# Patient Record
Sex: FEMALE | Race: WHITE | NOT HISPANIC OR LATINO | Employment: FULL TIME | URBAN - METROPOLITAN AREA
[De-identification: names, ages, dates, MRNs, and addresses within clinical notes are randomized per-mention and may not be internally consistent; named-entity substitution may affect disease eponyms.]

---

## 2023-04-05 PROBLEM — H92.02 LEFT EAR PAIN: Status: ACTIVE | Noted: 2023-04-05

## 2023-04-05 PROBLEM — M62.89 MUSCLE TIGHTNESS: Status: ACTIVE | Noted: 2023-04-05

## 2023-04-05 PROBLEM — E66.9 OBESITY: Status: ACTIVE | Noted: 2023-04-05

## 2023-04-05 PROBLEM — R50.9 FEVER: Status: ACTIVE | Noted: 2023-04-05

## 2023-04-05 PROBLEM — G47.00 INSOMNIA: Status: ACTIVE | Noted: 2023-04-05

## 2023-04-05 PROBLEM — R20.2 PARESTHESIA OF BOTH HANDS: Status: ACTIVE | Noted: 2023-04-05

## 2023-04-05 PROBLEM — M54.12 CERVICAL RADICULITIS: Status: ACTIVE | Noted: 2023-04-05

## 2023-04-05 PROBLEM — M75.122 COMPLETE TEAR OF LEFT ROTATOR CUFF: Status: ACTIVE | Noted: 2023-04-05

## 2023-04-05 PROBLEM — M75.102 LEFT ROTATOR CUFF TEAR: Status: ACTIVE | Noted: 2023-04-05
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
## 2023-04-05 PROBLEM — M76.30 ILIOTIBIAL BAND SYNDROME: Status: ACTIVE | Noted: 2023-04-05

## 2023-04-05 PROBLEM — M47.812 CERVICAL FACET SYNDROME: Status: ACTIVE | Noted: 2023-04-05

## 2023-04-05 PROBLEM — G56.41 CAUSALGIA OF RIGHT UPPER EXTREMITY: Status: ACTIVE | Noted: 2023-04-05

## 2023-04-05 PROBLEM — A08.4 VIRAL GASTROENTERITIS: Status: ACTIVE | Noted: 2023-04-05

## 2023-04-05 PROBLEM — M79.7 FIBROMYALGIA: Status: ACTIVE | Noted: 2023-04-05

## 2023-04-05 PROBLEM — M79.18 MYOFASCIAL PAIN: Status: ACTIVE | Noted: 2023-04-05

## 2023-04-05 PROBLEM — M25.511 PAIN IN JOINT OF RIGHT SHOULDER: Status: ACTIVE | Noted: 2023-04-05

## 2023-04-05 PROBLEM — M76.60 ACHILLES TENDONITIS: Status: ACTIVE | Noted: 2023-04-05

## 2023-04-05 PROBLEM — M54.2 NECK PAIN: Status: ACTIVE | Noted: 2023-04-05

## 2023-04-05 PROBLEM — R21 RASH: Status: ACTIVE | Noted: 2023-04-05

## 2023-04-05 PROBLEM — F41.9 ANXIETY: Status: ACTIVE | Noted: 2023-04-05

## 2023-04-05 PROBLEM — S00.93XA HEAD CONTUSION: Status: ACTIVE | Noted: 2023-04-05

## 2023-04-05 PROBLEM — M47.816 LUMBAR SPONDYLOSIS: Status: ACTIVE | Noted: 2023-04-05

## 2023-04-05 PROBLEM — J06.9 ACUTE RESPIRATORY DISEASE: Status: ACTIVE | Noted: 2023-04-05

## 2023-04-05 PROBLEM — M79.602 LEFT ARM PAIN: Status: ACTIVE | Noted: 2023-04-05

## 2023-04-05 PROBLEM — M19.90 OSTEOARTHRITIS: Status: ACTIVE | Noted: 2023-04-05

## 2023-04-05 PROBLEM — K57.92 ACUTE DIVERTICULITIS: Status: ACTIVE | Noted: 2023-04-05

## 2023-04-05 PROBLEM — L29.9 ITCHING: Status: ACTIVE | Noted: 2023-04-05

## 2023-04-05 PROBLEM — M75.82 TENDINITIS OF LEFT ROTATOR CUFF: Status: ACTIVE | Noted: 2023-04-05

## 2023-04-05 PROBLEM — M79.672 LEFT FOOT PAIN: Status: ACTIVE | Noted: 2023-04-05

## 2023-04-05 PROBLEM — M62.81 MUSCLE WEAKNESS: Status: ACTIVE | Noted: 2023-04-05

## 2023-04-05 PROBLEM — M54.16 LUMBAR RADICULOPATHY: Status: ACTIVE | Noted: 2023-04-05

## 2023-04-05 PROBLEM — K52.9 GASTROENTERITIS, ACUTE: Status: ACTIVE | Noted: 2023-04-05

## 2023-04-05 PROBLEM — R20.0 FACIAL NUMBNESS: Status: ACTIVE | Noted: 2023-04-05

## 2023-04-05 PROBLEM — M79.606 LOWER EXTREMITY PAIN: Status: ACTIVE | Noted: 2023-04-05

## 2023-04-05 PROBLEM — R26.2 DIFFICULTY WALKING: Status: ACTIVE | Noted: 2023-04-05

## 2023-04-05 PROBLEM — R79.89 LOW VITAMIN D LEVEL: Status: ACTIVE | Noted: 2023-04-05

## 2023-04-05 PROBLEM — Z20.822 SUSPECTED 2019 NOVEL CORONAVIRUS INFECTION: Status: ACTIVE | Noted: 2023-04-05

## 2023-04-05 PROBLEM — M75.50 SCAPULOTHORACIC BURSITIS: Status: ACTIVE | Noted: 2023-04-05

## 2023-04-05 PROBLEM — S40.022A CONTUSION OF LEFT ARM: Status: ACTIVE | Noted: 2023-04-05

## 2023-04-05 PROBLEM — M79.644 BILATERAL THUMB PAIN: Status: ACTIVE | Noted: 2023-04-05

## 2023-04-05 PROBLEM — M25.50 MULTIPLE JOINT PAIN: Status: ACTIVE | Noted: 2023-04-05

## 2023-04-05 PROBLEM — J32.9 SINUSITIS: Status: ACTIVE | Noted: 2023-04-05

## 2023-04-05 PROBLEM — M25.522 LEFT ELBOW PAIN: Status: ACTIVE | Noted: 2023-04-05

## 2023-04-05 PROBLEM — M65.4 RADIAL STYLOID TENOSYNOVITIS (DE QUERVAIN): Status: ACTIVE | Noted: 2023-04-05

## 2023-04-05 PROBLEM — R07.9 CHEST PAIN: Status: ACTIVE | Noted: 2023-04-05

## 2023-04-05 PROBLEM — M06.049: Status: ACTIVE | Noted: 2023-04-05

## 2023-04-05 PROBLEM — G44.86 CERVICOGENIC HEADACHE: Status: ACTIVE | Noted: 2023-04-05

## 2023-04-05 PROBLEM — F98.8 ADD (ATTENTION DEFICIT DISORDER): Status: ACTIVE | Noted: 2023-04-05

## 2023-04-05 PROBLEM — M43.10 ACQUIRED SPONDYLOLISTHESIS: Status: ACTIVE | Noted: 2023-04-05

## 2023-04-05 PROBLEM — R68.89 HEAT INTOLERANCE: Status: ACTIVE | Noted: 2023-04-05
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
## 2023-04-05 PROBLEM — M79.641 PAIN IN BOTH HANDS: Status: ACTIVE | Noted: 2023-04-05

## 2023-04-05 PROBLEM — R82.998 FOAMY URINE: Status: ACTIVE | Noted: 2023-04-05

## 2023-04-05 PROBLEM — G56.03 BILATERAL CARPAL TUNNEL SYNDROME: Status: ACTIVE | Noted: 2023-04-05

## 2023-04-05 PROBLEM — S63.509A WRIST SPRAIN: Status: ACTIVE | Noted: 2023-04-05

## 2023-04-05 PROBLEM — K21.9 ESOPHAGEAL REFLUX: Status: ACTIVE | Noted: 2023-04-05

## 2023-04-05 PROBLEM — N83.209 OVARIAN CYST: Status: ACTIVE | Noted: 2023-04-05

## 2023-04-05 PROBLEM — M25.579 ANKLE PAIN: Status: ACTIVE | Noted: 2023-04-05

## 2023-04-05 PROBLEM — M25.512 LEFT SHOULDER PAIN: Status: ACTIVE | Noted: 2023-04-05

## 2023-04-05 PROBLEM — J01.90 ACUTE RHINOSINUSITIS: Status: ACTIVE | Noted: 2023-04-05

## 2023-04-05 PROBLEM — R73.9 HYPERGLYCEMIA: Status: ACTIVE | Noted: 2023-04-05

## 2023-04-05 PROBLEM — G25.89 SCAPULAR DYSKINESIS: Status: ACTIVE | Noted: 2023-04-05

## 2023-04-05 PROBLEM — R10.9 ABDOMINAL PAIN: Status: ACTIVE | Noted: 2023-04-05

## 2023-04-05 PROBLEM — M54.81 OCCIPITAL NEURALGIA: Status: ACTIVE | Noted: 2023-04-05

## 2023-04-05 PROBLEM — F43.10 PTSD (POST-TRAUMATIC STRESS DISORDER): Status: ACTIVE | Noted: 2023-04-05

## 2023-04-05 PROBLEM — Q66.89 CLAW TOE: Status: ACTIVE | Noted: 2023-04-05

## 2023-04-05 PROBLEM — E78.5 HYPERLIPIDEMIA: Status: ACTIVE | Noted: 2023-04-05

## 2023-04-05 PROBLEM — M43.12 SPONDYLOLISTHESIS, CERVICAL REGION: Status: ACTIVE | Noted: 2023-04-05

## 2023-04-05 PROBLEM — M25.551 RIGHT HIP PAIN: Status: ACTIVE | Noted: 2023-04-05

## 2023-04-05 PROBLEM — M70.60 TROCHANTERIC BURSITIS: Status: ACTIVE | Noted: 2023-04-05

## 2023-04-05 PROBLEM — M54.50 LOW BACK PAIN: Status: ACTIVE | Noted: 2023-04-05

## 2023-04-05 PROBLEM — F41.8 DEPRESSION WITH ANXIETY: Status: ACTIVE | Noted: 2023-04-05

## 2023-04-05 PROBLEM — M79.642 PAIN IN BOTH HANDS: Status: ACTIVE | Noted: 2023-04-05

## 2023-04-05 PROBLEM — R20.8 BURNING SENSATION: Status: ACTIVE | Noted: 2023-04-05

## 2023-04-05 PROBLEM — R51.9 HEADACHE: Status: ACTIVE | Noted: 2023-04-05

## 2023-04-05 PROBLEM — M79.645 BILATERAL THUMB PAIN: Status: ACTIVE | Noted: 2023-04-05

## 2023-04-05 PROBLEM — T14.8XXA HEMATOMA AND CONTUSION: Status: ACTIVE | Noted: 2023-04-05

## 2023-04-05 RX ORDER — FLUTICASONE PROPIONATE 50 MCG
SPRAY, SUSPENSION (ML) NASAL
COMMUNITY
Start: 2020-01-29

## 2023-04-05 RX ORDER — VIT C/E/ZN/COPPR/LUTEIN/ZEAXAN 250MG-90MG
1 CAPSULE ORAL DAILY
COMMUNITY

## 2023-04-05 RX ORDER — VILAZODONE HYDROCHLORIDE 40 MG/1
40 TABLET ORAL
COMMUNITY
Start: 2016-06-13

## 2023-04-05 RX ORDER — GABAPENTIN 600 MG/1
2 TABLET ORAL 3 TIMES DAILY
COMMUNITY
Start: 2017-05-05 | End: 2023-10-16 | Stop reason: SDUPTHER

## 2023-04-05 RX ORDER — ONDANSETRON 4 MG/1
TABLET, FILM COATED ORAL
COMMUNITY
Start: 2022-09-30 | End: 2023-04-11 | Stop reason: ALTCHOICE

## 2023-04-05 RX ORDER — TIZANIDINE HYDROCHLORIDE 2 MG/1
2 CAPSULE, GELATIN COATED ORAL NIGHTLY
COMMUNITY
Start: 2017-07-13 | End: 2023-11-17 | Stop reason: SDUPTHER

## 2023-04-05 RX ORDER — ESOMEPRAZOLE MAGNESIUM 40 MG/1
1 CAPSULE, DELAYED RELEASE ORAL DAILY
COMMUNITY
Start: 2012-07-11

## 2023-04-05 RX ORDER — LORATADINE 10 MG/1
1 TABLET ORAL DAILY
COMMUNITY
Start: 2021-12-05 | End: 2023-04-11 | Stop reason: ALTCHOICE

## 2023-04-05 RX ORDER — LIDOCAINE AND PRILOCAINE 25; 25 MG/G; MG/G
CREAM TOPICAL
COMMUNITY
Start: 2021-09-08 | End: 2023-04-11 | Stop reason: ALTCHOICE

## 2023-04-05 RX ORDER — FLUTICASONE PROPIONATE 0.05 MG/G
OINTMENT TOPICAL
COMMUNITY
Start: 2021-08-26 | End: 2023-09-20 | Stop reason: ALTCHOICE

## 2023-04-11 ENCOUNTER — OFFICE VISIT (OUTPATIENT)
Dept: PRIMARY CARE | Facility: CLINIC | Age: 58
End: 2023-04-11
Payer: COMMERCIAL

## 2023-04-11 VITALS
WEIGHT: 173 LBS | HEART RATE: 72 BPM | BODY MASS INDEX: 27.15 KG/M2 | TEMPERATURE: 97.9 F | DIASTOLIC BLOOD PRESSURE: 75 MMHG | SYSTOLIC BLOOD PRESSURE: 113 MMHG | OXYGEN SATURATION: 95 % | HEIGHT: 67 IN

## 2023-04-11 DIAGNOSIS — G89.29 CHRONIC LEFT-SIDED LOW BACK PAIN WITH LEFT-SIDED SCIATICA: ICD-10-CM

## 2023-04-11 DIAGNOSIS — M25.551 PAIN OF RIGHT HIP: Primary | ICD-10-CM

## 2023-04-11 DIAGNOSIS — M54.42 CHRONIC LEFT-SIDED LOW BACK PAIN WITH LEFT-SIDED SCIATICA: ICD-10-CM

## 2023-04-11 PROBLEM — M77.32 CALCANEAL SPUR, LEFT: Status: ACTIVE | Noted: 2022-09-16

## 2023-04-11 PROBLEM — F32.A DEPRESSION: Status: ACTIVE | Noted: 2023-04-11

## 2023-04-11 PROBLEM — K21.9 GERD (GASTROESOPHAGEAL REFLUX DISEASE): Status: ACTIVE | Noted: 2023-04-11

## 2023-04-11 PROBLEM — M25.579 PAIN IN JOINT INVOLVING ANKLE AND FOOT: Status: ACTIVE | Noted: 2023-01-16

## 2023-04-11 PROBLEM — M43.10 SPONDYLOLISTHESIS: Status: ACTIVE | Noted: 2023-04-11

## 2023-04-11 PROBLEM — M21.6X9 EQUINUS DEFORMITY OF FOOT: Status: ACTIVE | Noted: 2023-01-16

## 2023-04-11 PROBLEM — M25.571 CHRONIC PAIN OF RIGHT ANKLE: Status: ACTIVE | Noted: 2022-09-16

## 2023-04-11 PROBLEM — M26.629 TMJ SYNDROME: Status: ACTIVE | Noted: 2023-04-11

## 2023-04-11 PROBLEM — M93.271 OSTEOCHONDRITIS DISSECANS OF RIGHT TALUS: Status: ACTIVE | Noted: 2022-09-16

## 2023-04-11 PROBLEM — M76.62 TENDONITIS, ACHILLES, LEFT: Status: ACTIVE | Noted: 2022-09-16

## 2023-04-11 PROBLEM — M47.812 CERVICAL ARTHRITIS: Status: ACTIVE | Noted: 2023-04-11

## 2023-04-11 PROBLEM — R09.82 PND (POST-NASAL DRIP): Status: ACTIVE | Noted: 2023-02-20

## 2023-04-11 PROBLEM — M47.812 CERVICAL SPONDYLOSIS WITHOUT MYELOPATHY: Status: ACTIVE | Noted: 2021-11-30

## 2023-04-11 PROBLEM — M06.049: Status: RESOLVED | Noted: 2023-04-05 | Resolved: 2023-04-11

## 2023-04-11 PROBLEM — J31.0 RHINITIS: Status: ACTIVE | Noted: 2023-02-20

## 2023-04-11 PROBLEM — M79.673 FOOT ARCH PAIN: Status: ACTIVE | Noted: 2022-09-16

## 2023-04-11 PROCEDURE — 99213 OFFICE O/P EST LOW 20 MIN: CPT | Performed by: INTERNAL MEDICINE

## 2023-04-11 PROCEDURE — 1036F TOBACCO NON-USER: CPT | Performed by: INTERNAL MEDICINE

## 2023-04-11 ASSESSMENT — PATIENT HEALTH QUESTIONNAIRE - PHQ9
SUM OF ALL RESPONSES TO PHQ9 QUESTIONS 1 AND 2: 0
2. FEELING DOWN, DEPRESSED OR HOPELESS: NOT AT ALL
1. LITTLE INTEREST OR PLEASURE IN DOING THINGS: NOT AT ALL

## 2023-04-11 NOTE — PROGRESS NOTES
Patient is here for hip pain that has been occurring for 3-4 weeks. Patient also complains of lower back pain which has also been occurring for 3-4 weeks.

## 2023-04-11 NOTE — PROGRESS NOTES
"Subjective   Patient ID: Jennifer Juan is a 57 y.o. female who presents for Hip Pain and Back Pain.    Here because of right hip pain when walking for 2 weeks,she is known to have LBP with radiculopathy,same.  No h/o fall or injury.         Review of Systems   Reason unable to perform ROS: as HPI.       Objective   /75 (BP Location: Right arm, Patient Position: Sitting, BP Cuff Size: Adult)   Pulse 72   Temp 36.6 °C (97.9 °F) (Temporal)   Ht 1.702 m (5' 7\")   Wt 78.5 kg (173 lb)   SpO2 95%   BMI 27.10 kg/m²     Physical Exam  Vitals reviewed.   Constitutional:       General: She is not in acute distress.     Appearance: Normal appearance.   HENT:      Head: Normocephalic and atraumatic.      Right Ear: Tympanic membrane and ear canal normal.      Left Ear: Tympanic membrane and ear canal normal.   Eyes:      Extraocular Movements: Extraocular movements intact.      Pupils: Pupils are equal, round, and reactive to light.   Cardiovascular:      Rate and Rhythm: Normal rate and regular rhythm.      Pulses: Normal pulses.      Heart sounds: Normal heart sounds. No murmur heard.  Pulmonary:      Effort: Pulmonary effort is normal.      Breath sounds: Normal breath sounds.   Abdominal:      General: Abdomen is flat. Bowel sounds are normal.      Palpations: Abdomen is soft.   Musculoskeletal:      Cervical back: Normal range of motion and neck supple.      Comments: Right hip:no palpable pain,fair ROM.  Positive R straight leg raising.   Neurological:      General: No focal deficit present.      Mental Status: She is alert and oriented to person, place, and time.   Psychiatric:         Mood and Affect: Mood normal.         Assessment/Plan          "
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
## 2023-04-15 PROBLEM — R07.9 CHEST PAIN: Status: RESOLVED | Noted: 2023-04-05 | Resolved: 2023-04-15

## 2023-04-16 NOTE — RESULT ENCOUNTER NOTE
Your right hip XRAY shows mild osteoarthritis. See your spine orthopedic for follow up and discuss your hip pain.

## 2023-04-16 NOTE — PATIENT INSTRUCTIONS
Suspect R hip pain affected by her spondylosis,we will check XRAY R hip for further evaluation.  Stretching exercise.  Refer back to her spine specialist.

## 2023-06-30 ENCOUNTER — OFFICE VISIT (OUTPATIENT)
Dept: PRIMARY CARE | Facility: CLINIC | Age: 58
End: 2023-06-30
Payer: COMMERCIAL

## 2023-06-30 VITALS
DIASTOLIC BLOOD PRESSURE: 60 MMHG | HEIGHT: 66 IN | SYSTOLIC BLOOD PRESSURE: 122 MMHG | HEART RATE: 72 BPM | WEIGHT: 170 LBS | OXYGEN SATURATION: 97 % | TEMPERATURE: 96.9 F | BODY MASS INDEX: 27.32 KG/M2

## 2023-06-30 DIAGNOSIS — R21 SKIN RASH: Primary | ICD-10-CM

## 2023-06-30 PROCEDURE — 99213 OFFICE O/P EST LOW 20 MIN: CPT | Performed by: STUDENT IN AN ORGANIZED HEALTH CARE EDUCATION/TRAINING PROGRAM

## 2023-06-30 PROCEDURE — 1036F TOBACCO NON-USER: CPT | Performed by: STUDENT IN AN ORGANIZED HEALTH CARE EDUCATION/TRAINING PROGRAM

## 2023-06-30 RX ORDER — DICYCLOMINE HYDROCHLORIDE 20 MG/1
20 TABLET ORAL
COMMUNITY
Start: 2023-06-21

## 2023-06-30 RX ORDER — ZOLPIDEM TARTRATE 12.5 MG/1
12.5 TABLET, FILM COATED, EXTENDED RELEASE ORAL NIGHTLY PRN
COMMUNITY

## 2023-06-30 RX ORDER — VALACYCLOVIR HYDROCHLORIDE 500 MG/1
500 TABLET, FILM COATED ORAL DAILY
COMMUNITY

## 2023-06-30 RX ORDER — AMOXICILLIN AND CLAVULANATE POTASSIUM 875; 125 MG/1; MG/1
875 TABLET, FILM COATED ORAL 2 TIMES DAILY
COMMUNITY
Start: 2023-01-04 | End: 2023-09-20 | Stop reason: ALTCHOICE

## 2023-06-30 RX ORDER — BACLOFEN 10 MG/1
10 TABLET ORAL 2 TIMES DAILY
COMMUNITY

## 2023-06-30 ASSESSMENT — ENCOUNTER SYMPTOMS
SHORTNESS OF BREATH: 0
DYSURIA: 0
VOMITING: 0
WEAKNESS: 0
DIAPHORESIS: 0
NUMBNESS: 0
HEADACHES: 0
NAUSEA: 0
DIZZINESS: 0
DIARRHEA: 0
FEVER: 0
LIGHT-HEADEDNESS: 0
CHILLS: 0

## 2023-06-30 NOTE — PROGRESS NOTES
"Subjective   Patient ID: Jennifer Juan is a 57 y.o. female who presents for Rash.  Here for a rash on her arms, face, and legs with blisters. Putting cortisone and benadryl on the rash. Noticed first bump about 6 days ago and then became itchy 3-4 days ago. Hasn't had this rash before. She works in . 6 days ago she was working in the yard and working by a fence and was outside at her friend's house, thinks there may have been bugs. She wore shorts and a short sleeve. Only has 2-3 bumps on the anterior torso-started about a month ago. Saw dermatology a month. She has 2 dogs who are healthy. No one else has the rash. She lives alone. Traveled to [place] 3ish weeks ago. No new soaps or shampoos, no new clothing or jewelry.     Following with GI for diverticulitis. She hasn't started augmentin yet.    Rash  Pertinent negatives include no diarrhea, fever, shortness of breath or vomiting.       Review of Systems   Constitutional:  Negative for chills, diaphoresis and fever.   HENT:  Negative for hearing loss.    Eyes:  Negative for visual disturbance.   Respiratory:  Negative for shortness of breath.    Cardiovascular:  Negative for chest pain.   Gastrointestinal:  Negative for diarrhea, nausea and vomiting.   Endocrine: Negative for cold intolerance and heat intolerance.   Genitourinary:  Negative for dysuria.   Skin:  Positive for rash.   Neurological:  Negative for dizziness, syncope, weakness, light-headedness, numbness and headaches.       /60   Pulse 72   Temp 36.1 °C (96.9 °F) (Skin)   Ht 1.676 m (5' 6\")   Wt 77.1 kg (170 lb)   SpO2 97%   BMI 27.44 kg/m²     Objective   Physical Exam  Vitals reviewed.   HENT:      Head: Normocephalic.   Cardiovascular:      Rate and Rhythm: Normal rate and regular rhythm.   Pulmonary:      Effort: Pulmonary effort is normal. No respiratory distress.      Breath sounds: Normal breath sounds.   Abdominal:      General: There is no distension. "   Musculoskeletal:         General: No deformity.   Skin:     Coloration: Skin is not jaundiced.      Comments: Thickened areas over the skin about 0.5 to 1 cm in diameter, some surrounding erythema.  Lesions are present throughout the distal forearms, distal lower extremities, and face.  1-2 spots present on each extremity.   Neurological:      Mental Status: She is alert.   Psychiatric:         Mood and Affect: Mood normal.         Behavior: Behavior normal.         Assessment/Plan   Problem List Items Addressed This Visit       Skin rash - Primary     Discussed that clinically this rash appears to be related to insect bites given their presence over exposed areas of the skin on Saturday when she was outside with bugs.  Discussed that in some cases there can be a slightly delayed allergic response to the bites.  Advised to try daily Zyrtec or Claritin to help with the histamine response.  Advised follow-up with dermatology if no better within the next week.

## 2023-07-01 PROBLEM — R21 SKIN RASH: Status: ACTIVE | Noted: 2023-07-01

## 2023-07-01 NOTE — ASSESSMENT & PLAN NOTE
Discussed that clinically this rash appears to be related to insect bites given their presence over exposed areas of the skin on Saturday when she was outside with bugs.  Discussed that in some cases there can be a slightly delayed allergic response to the bites.  Advised to try daily Zyrtec or Claritin to help with the histamine response.  Advised follow-up with dermatology if no better within the next week.

## 2023-07-03 ENCOUNTER — TELEPHONE (OUTPATIENT)
Dept: PRIMARY CARE | Facility: CLINIC | Age: 58
End: 2023-07-03
Payer: COMMERCIAL

## 2023-07-03 NOTE — TELEPHONE ENCOUNTER
Patient states that patient is getting more bites on her face - went to urgent care yesterday, they prescribed prednisone. Patient is concerned about diverticulitis. Patient was told to follow up with Dr. Gonzalez if she wasn't any better. Patient hasn't started the antibiotic, please advise.  
no

## 2023-07-05 DIAGNOSIS — R21 SKIN RASH: Primary | ICD-10-CM

## 2023-09-20 ENCOUNTER — OFFICE VISIT (OUTPATIENT)
Dept: PRIMARY CARE | Facility: CLINIC | Age: 58
End: 2023-09-20
Payer: COMMERCIAL

## 2023-09-20 VITALS
RESPIRATION RATE: 17 BRPM | BODY MASS INDEX: 26.95 KG/M2 | DIASTOLIC BLOOD PRESSURE: 72 MMHG | SYSTOLIC BLOOD PRESSURE: 112 MMHG | OXYGEN SATURATION: 94 % | TEMPERATURE: 97.2 F | HEART RATE: 72 BPM | WEIGHT: 167 LBS

## 2023-09-20 DIAGNOSIS — K21.9 GASTROESOPHAGEAL REFLUX DISEASE, UNSPECIFIED WHETHER ESOPHAGITIS PRESENT: Chronic | ICD-10-CM

## 2023-09-20 DIAGNOSIS — J34.89 SINUS PRESSURE: ICD-10-CM

## 2023-09-20 DIAGNOSIS — R07.89 CHEST PRESSURE: Primary | ICD-10-CM

## 2023-09-20 DIAGNOSIS — H92.02 EAR PAIN, LEFT: ICD-10-CM

## 2023-09-20 PROBLEM — U07.1 COVID-19: Status: ACTIVE | Noted: 2023-09-20
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
## 2023-09-20 PROBLEM — R19.7 ACUTE DIARRHEA: Status: ACTIVE | Noted: 2023-09-20

## 2023-09-20 PROBLEM — M54.9 BACK PAIN: Status: ACTIVE | Noted: 2023-09-20

## 2023-09-20 PROBLEM — J01.90 ACUTE SINUSITIS: Status: ACTIVE | Noted: 2023-09-20

## 2023-09-20 PROBLEM — M06.049: Status: ACTIVE | Noted: 2023-09-20

## 2023-09-20 PROBLEM — M54.12 CERVICAL RADICULOPATHY: Status: ACTIVE | Noted: 2023-09-20

## 2023-09-20 PROBLEM — R07.9 CHEST PAIN: Status: ACTIVE | Noted: 2023-09-20

## 2023-09-20 PROCEDURE — 93000 ELECTROCARDIOGRAM COMPLETE: CPT | Performed by: NURSE PRACTITIONER

## 2023-09-20 PROCEDURE — 99214 OFFICE O/P EST MOD 30 MIN: CPT | Performed by: NURSE PRACTITIONER

## 2023-09-20 PROCEDURE — 1036F TOBACCO NON-USER: CPT | Performed by: NURSE PRACTITIONER

## 2023-09-20 ASSESSMENT — COLUMBIA-SUICIDE SEVERITY RATING SCALE - C-SSRS
1. IN THE PAST MONTH, HAVE YOU WISHED YOU WERE DEAD OR WISHED YOU COULD GO TO SLEEP AND NOT WAKE UP?: NO
6. HAVE YOU EVER DONE ANYTHING, STARTED TO DO ANYTHING, OR PREPARED TO DO ANYTHING TO END YOUR LIFE?: NO
2. HAVE YOU ACTUALLY HAD ANY THOUGHTS OF KILLING YOURSELF?: NO

## 2023-09-20 ASSESSMENT — PAIN SCALES - GENERAL: PAINLEVEL: 3

## 2023-09-20 ASSESSMENT — PATIENT HEALTH QUESTIONNAIRE - PHQ9
SUM OF ALL RESPONSES TO PHQ9 QUESTIONS 1 AND 2: 0
1. LITTLE INTEREST OR PLEASURE IN DOING THINGS: NOT AT ALL
2. FEELING DOWN, DEPRESSED OR HOPELESS: NOT AT ALL

## 2023-09-20 ASSESSMENT — ENCOUNTER SYMPTOMS
OCCASIONAL FEELINGS OF UNSTEADINESS: 0
LOSS OF SENSATION IN FEET: 0
DEPRESSION: 0

## 2023-09-20 NOTE — ASSESSMENT & PLAN NOTE
Discuss with GI.  Continue Nexium BID  GERD DIET:  Avoid spicy foods, tomato based products, green peppers/onions, citrus foods/drinks (Orange and grapefruit juice especially), peppermint, caffeine, chocolate, alcohol and avoid lying down after meals for at least 3 hours.  Make sure you take your medications (PPI or H2 Blocker) at least 30-60 mins before your first meal of the day. Not doing this is a large cause of failure to medical treatment.

## 2023-09-20 NOTE — PROGRESS NOTES
Subjective   Patient ID: Jennifer Juan is a 57 y.o. female who presents for Possible sinus infection  and Sinusitis (The patient is here for a possible sinus infection. ).    Patient of Dr. Li. Presents with left sided facial pain.  Started last week after traveling.  She has left sided sinus pain and left ear pain. Worse with flying.  She has worsening refux for the past 2 weeks. She did call her GI. Awaiting callback. She started taking her Nexium BID to see if helps which it has in the past.  Lots of stress with working.   Endorses some chest pressure.    Currently trying Claritin, tylenol, Flonase ( had not been taking regularly)         Review of Systems  otherwise negative aside from what was mentioned above in HPI.    Objective   /72 (BP Location: Left arm, Patient Position: Sitting)   Pulse 72   Temp 36.2 °C (97.2 °F)   Resp 17   Wt 75.8 kg (167 lb)   SpO2 94%   BMI 26.95 kg/m²     Physical Exam  Constitutional:       Appearance: Normal appearance.   HENT:      Right Ear: Tympanic membrane normal.      Left Ear: Tympanic membrane normal.      Mouth/Throat:      Pharynx: Oropharynx is clear. No oropharyngeal exudate or posterior oropharyngeal erythema.   Eyes:      Conjunctiva/sclera: Conjunctivae normal.   Cardiovascular:      Rate and Rhythm: Normal rate and regular rhythm.   Pulmonary:      Effort: Pulmonary effort is normal.      Breath sounds: Normal breath sounds.   Abdominal:      General: Abdomen is flat. Bowel sounds are normal.      Palpations: Abdomen is soft.   Musculoskeletal:         General: Normal range of motion.   Neurological:      General: No focal deficit present.      Mental Status: She is alert and oriented to person, place, and time. Mental status is at baseline.   Psychiatric:         Mood and Affect: Mood normal.         Behavior: Behavior normal.         Thought Content: Thought content normal.         Judgment: Judgment normal.         Assessment/Plan   Problem  List Items Addressed This Visit          Gastrointestinal and Abdominal    GERD (gastroesophageal reflux disease) (Chronic)     Discuss with GI.  Continue Nexium BID  GERD DIET:  Avoid spicy foods, tomato based products, green peppers/onions, citrus foods/drinks (Orange and grapefruit juice especially), peppermint, caffeine, chocolate, alcohol and avoid lying down after meals for at least 3 hours.  Make sure you take your medications (PPI or H2 Blocker) at least 30-60 mins before your first meal of the day. Not doing this is a large cause of failure to medical treatment.            Other Visit Diagnoses       Chest pressure    -  Primary    Relevant Orders    ECG 12 lead (Clinic Performed)    Ear pain, left        Sinus pressure            ECG showing sinus rhythm.   Since history of C diff so she would like to avoid ABX and sinus pressure lasting less than 1 week. Advised increase Flonase use, start Claritin and use decongestant. She has upcoming flight and pain is worse with travel. Discussed may want to cancel if pressure is bad. If worsening will call back sooner.

## 2023-10-16 ENCOUNTER — TELEPHONE (OUTPATIENT)
Dept: NEUROLOGY | Facility: CLINIC | Age: 58
End: 2023-10-16

## 2023-10-16 DIAGNOSIS — M48.061 SPINAL STENOSIS OF LUMBAR REGION WITHOUT NEUROGENIC CLAUDICATION: Primary | ICD-10-CM

## 2023-10-16 RX ORDER — GABAPENTIN 600 MG/1
1200 TABLET ORAL 3 TIMES DAILY
Qty: 180 TABLET | Refills: 0 | Status: SHIPPED | OUTPATIENT
Start: 2023-10-16 | End: 2023-11-17 | Stop reason: SDUPTHER

## 2023-10-17 ENCOUNTER — APPOINTMENT (OUTPATIENT)
Dept: ORTHOPEDIC SURGERY | Facility: CLINIC | Age: 58
End: 2023-10-17
Payer: COMMERCIAL

## 2023-11-09 ENCOUNTER — OFFICE VISIT (OUTPATIENT)
Dept: ORTHOPEDIC SURGERY | Facility: CLINIC | Age: 58
End: 2023-11-09
Payer: COMMERCIAL

## 2023-11-09 DIAGNOSIS — M47.817 LUMBOSACRAL SPONDYLOSIS WITHOUT MYELOPATHY: Primary | ICD-10-CM

## 2023-11-09 DIAGNOSIS — M17.0 ARTHRITIS OF BOTH KNEES: ICD-10-CM

## 2023-11-09 PROCEDURE — 1036F TOBACCO NON-USER: CPT | Performed by: FAMILY MEDICINE

## 2023-11-09 PROCEDURE — 99203 OFFICE O/P NEW LOW 30 MIN: CPT | Performed by: FAMILY MEDICINE

## 2023-11-09 ASSESSMENT — PAIN - FUNCTIONAL ASSESSMENT: PAIN_FUNCTIONAL_ASSESSMENT: 0-10

## 2023-11-09 ASSESSMENT — PAIN DESCRIPTION - DESCRIPTORS: DESCRIPTORS: STABBING

## 2023-11-09 ASSESSMENT — PAIN SCALES - GENERAL: PAINLEVEL_OUTOF10: 1

## 2023-11-09 NOTE — PROGRESS NOTES
NPV R hip pain/ B/L knee  Subjective    Patient ID: Jennifer Farias is a 58 y.o. female.    Chief Complaint: Pain of the Right Hip  Jennifer is a new patient that presents with right hip pain and bilateral knee pain.  The most pressing issue currently is her right hip.  It is a stabbing type pain that comes and goes, but is currently a 1 out of 10 at its worst.  She has been dealing with it for for 5 months now it does seem to be getting better.  Prolonged walking makes it worse.  She has seen improvement with Tylenol heat, and ice.  She is also taken Voltaren.  Most of her discomfort seems to be in the inner thigh as well.  It will travel down to the knee at times.  She has a known history of lumbar spinal stenosis and spondylolysis at the L5-S1 level.  Her most recent MRI was about a year ago, but she feels this is a different type of discomfort.    Objective   Musculoskeletal: Lumbar spine-there is no midline tenderness to palpation or step-off.  Negative bilateral seated slump.  Her hip exam was as expected.  She had some limited range of motion with internal and external rotation.  Mildly positive logroll.  Good strength and was neurovascularly intact distally.    Image Results: No additional imaging was obtained today.  Narrative & Impression   Interpreted By:  LIZZETH ENRIQUE MD  MRN: [id number]  Patient Name: JENNIFER FARIAS     STUDY:  HIP, UNILATERAL W/PELVIS WHEN PERFORMED 2-3 VIEWS     INDICATION:  right hip pain x 3-4 weeks,has h/o radiculopathy..     COMPARISON:  December 14, 2017     ACCESSION NUMBER(S):  [id number]     ORDERING CLINICIAN:  HAWA BOYD     FINDINGS:  Mild osteoarthritis right hip. No fracture seen. No osseous lesions.     IMPRESSION:  Mild right hip osteoarthritis.       Assessment/Plan   Encounter Diagnoses:  Lumbosacral spondylosis without myelopathy    Arthritis of both knees    Orders Placed This Encounter    Referral to Physical Therapy   There is likely a component of core muscle  weakness along with some hip and knee arthritis.  We are going to get her into formal physical therapy.  I would like her to follow-up in 4 to 6 weeks for reevaluation, sooner if she has any setbacks.  We reviewed the red flags of lumbar stenosis and she should go to the emergency room or call the office if she experiences any of these.  She can continue with her current medication regimen and home exercise program.  All of her questions were answered and she agrees with treatment plan.    ** Please excuse any errors in grammar or translation related to this dictation. Voice recognition software was utilized to prepare this document. **    Roberto Castellon M.D.  Clinical , Division of Sports Medicine  Primary Care Sports Medicine  Department of Orthopedic Surgery  [facility] [place]

## 2023-11-17 ENCOUNTER — OFFICE VISIT (OUTPATIENT)
Dept: NEUROLOGY | Facility: CLINIC | Age: 58
End: 2023-11-17
Payer: COMMERCIAL

## 2023-11-17 ENCOUNTER — LAB (OUTPATIENT)
Dept: LAB | Facility: LAB | Age: 58
End: 2023-11-17
Payer: COMMERCIAL

## 2023-11-17 VITALS
SYSTOLIC BLOOD PRESSURE: 124 MMHG | BODY MASS INDEX: 28.07 KG/M2 | DIASTOLIC BLOOD PRESSURE: 80 MMHG | HEART RATE: 72 BPM | WEIGHT: 173.9 LBS

## 2023-11-17 DIAGNOSIS — M54.42 CHRONIC LEFT-SIDED LOW BACK PAIN WITH LEFT-SIDED SCIATICA: Primary | ICD-10-CM

## 2023-11-17 DIAGNOSIS — G44.86 CERVICOGENIC HEADACHE: ICD-10-CM

## 2023-11-17 DIAGNOSIS — G89.29 CHRONIC LEFT-SIDED LOW BACK PAIN WITH LEFT-SIDED SCIATICA: Primary | ICD-10-CM

## 2023-11-17 DIAGNOSIS — M79.18 MYOFASCIAL PAIN: ICD-10-CM

## 2023-11-17 DIAGNOSIS — M48.061 SPINAL STENOSIS OF LUMBAR REGION WITHOUT NEUROGENIC CLAUDICATION: ICD-10-CM

## 2023-11-17 DIAGNOSIS — M54.42 CHRONIC LEFT-SIDED LOW BACK PAIN WITH LEFT-SIDED SCIATICA: ICD-10-CM

## 2023-11-17 DIAGNOSIS — G89.29 CHRONIC LEFT-SIDED LOW BACK PAIN WITH LEFT-SIDED SCIATICA: ICD-10-CM

## 2023-11-17 LAB
AMPHETAMINES UR QL SCN: ABNORMAL
BARBITURATES UR QL SCN: ABNORMAL
BZE UR QL SCN: ABNORMAL
CANNABINOIDS UR QL SCN: ABNORMAL
CREAT UR-MCNC: 19.6 MG/DL (ref 20–320)
PCP UR QL SCN: ABNORMAL
SP GR UR STRIP.AUTO: 1

## 2023-11-17 PROCEDURE — 80365 DRUG SCREENING OXYCODONE: CPT

## 2023-11-17 PROCEDURE — 80307 DRUG TEST PRSMV CHEM ANLYZR: CPT

## 2023-11-17 PROCEDURE — 80361 OPIATES 1 OR MORE: CPT

## 2023-11-17 PROCEDURE — 80368 SEDATIVE HYPNOTICS: CPT

## 2023-11-17 PROCEDURE — 80358 DRUG SCREENING METHADONE: CPT

## 2023-11-17 PROCEDURE — 80346 BENZODIAZEPINES1-12: CPT

## 2023-11-17 PROCEDURE — 99214 OFFICE O/P EST MOD 30 MIN: CPT | Performed by: NURSE PRACTITIONER

## 2023-11-17 PROCEDURE — 81003 URINALYSIS AUTO W/O SCOPE: CPT

## 2023-11-17 PROCEDURE — 82570 ASSAY OF URINE CREATININE: CPT

## 2023-11-17 PROCEDURE — 80354 DRUG SCREENING FENTANYL: CPT

## 2023-11-17 PROCEDURE — 80373 DRUG SCREENING TRAMADOL: CPT

## 2023-11-17 PROCEDURE — 1036F TOBACCO NON-USER: CPT | Performed by: NURSE PRACTITIONER

## 2023-11-17 RX ORDER — IBUPROFEN 600 MG/1
TABLET ORAL
COMMUNITY

## 2023-11-17 RX ORDER — PREDNISONE 10 MG/1
TABLET ORAL
COMMUNITY
Start: 2023-07-02 | End: 2024-02-27 | Stop reason: ALTCHOICE

## 2023-11-17 RX ORDER — CEFDINIR 300 MG/1
300 CAPSULE ORAL EVERY 12 HOURS
COMMUNITY
Start: 2023-09-21 | End: 2024-02-27 | Stop reason: ALTCHOICE

## 2023-11-17 RX ORDER — TIZANIDINE HYDROCHLORIDE 2 MG/1
2 CAPSULE, GELATIN COATED ORAL NIGHTLY
Qty: 90 CAPSULE | Refills: 1 | Status: SHIPPED | OUTPATIENT
Start: 2023-11-17

## 2023-11-17 RX ORDER — OMEPRAZOLE 40 MG/1
CAPSULE, DELAYED RELEASE ORAL
COMMUNITY
Start: 2023-09-21

## 2023-11-17 RX ORDER — TIZANIDINE 2 MG/1
2 TABLET ORAL NIGHTLY
COMMUNITY
Start: 2023-03-15 | End: 2023-11-17 | Stop reason: SDUPTHER

## 2023-11-17 RX ORDER — HYDROCORTISONE 25 MG/G
CREAM TOPICAL
COMMUNITY
Start: 2023-07-06 | End: 2024-02-27 | Stop reason: WASHOUT

## 2023-11-17 RX ORDER — CHLORDIAZEPOXIDE HYDROCHLORIDE AND CLIDINIUM BROMIDE 5; 2.5 MG/1; MG/1
1 CAPSULE ORAL 3 TIMES DAILY PRN
COMMUNITY
Start: 2023-07-24

## 2023-11-17 RX ORDER — CHLORHEXIDINE GLUCONATE ORAL RINSE 1.2 MG/ML
SOLUTION DENTAL
COMMUNITY
Start: 2023-04-08

## 2023-11-17 RX ORDER — GABAPENTIN 600 MG/1
1200 TABLET ORAL 3 TIMES DAILY
Qty: 540 TABLET | Refills: 1 | Status: SHIPPED | OUTPATIENT
Start: 2023-11-17 | End: 2024-05-03 | Stop reason: SINTOL

## 2023-11-17 RX ORDER — CLOBETASOL PROPIONATE 0.5 MG/G
CREAM TOPICAL
COMMUNITY
Start: 2023-07-06 | End: 2024-02-27 | Stop reason: ALTCHOICE

## 2023-11-17 RX ORDER — METHYLPREDNISOLONE 4 MG/1
TABLET ORAL
COMMUNITY
Start: 2023-08-30 | End: 2024-02-27 | Stop reason: ALTCHOICE

## 2023-11-17 ASSESSMENT — LIFESTYLE VARIABLES
HOW OFTEN DO YOU HAVE SIX OR MORE DRINKS ON ONE OCCASION: NEVER
HOW OFTEN DO YOU HAVE SIX OR MORE DRINKS ON ONE OCCASION: NEVER
HOW OFTEN DO YOU HAVE A DRINK CONTAINING ALCOHOL: MONTHLY OR LESS
HOW MANY STANDARD DRINKS CONTAINING ALCOHOL DO YOU HAVE ON A TYPICAL DAY: PATIENT DOES NOT DRINK

## 2023-11-17 ASSESSMENT — ENCOUNTER SYMPTOMS
LOSS OF SENSATION IN FEET: 0
DEPRESSION: 0
OCCASIONAL FEELINGS OF UNSTEADINESS: 0

## 2023-11-17 ASSESSMENT — ANXIETY QUESTIONNAIRES
IF YOU CHECKED OFF ANY PROBLEMS ON THIS QUESTIONNAIRE, HOW DIFFICULT HAVE THESE PROBLEMS MADE IT FOR YOU TO DO YOUR WORK, TAKE CARE OF THINGS AT HOME, OR GET ALONG WITH OTHER PEOPLE: NOT DIFFICULT AT ALL
5. BEING SO RESTLESS THAT IT IS HARD TO SIT STILL: NOT AT ALL
4. TROUBLE RELAXING: NOT AT ALL
GAD7 TOTAL SCORE: 0
6. BECOMING EASILY ANNOYED OR IRRITABLE: NOT AT ALL
3. WORRYING TOO MUCH ABOUT DIFFERENT THINGS: NOT AT ALL
7. FEELING AFRAID AS IF SOMETHING AWFUL MIGHT HAPPEN: NOT AT ALL
2. NOT BEING ABLE TO STOP OR CONTROL WORRYING: NOT AT ALL
1. FEELING NERVOUS, ANXIOUS, OR ON EDGE: NOT AT ALL

## 2023-11-17 ASSESSMENT — PATIENT HEALTH QUESTIONNAIRE - PHQ9
2. FEELING DOWN, DEPRESSED OR HOPELESS: NOT AT ALL
1. LITTLE INTEREST OR PLEASURE IN DOING THINGS: NOT AT ALL
SUM OF ALL RESPONSES TO PHQ9 QUESTIONS 1 & 2: 0

## 2023-11-17 NOTE — PROGRESS NOTES
Patient being assessed today for follow-up of myofascial pain, cervicogenic headache.  She reports that the tizanidine and gabapentin are effective with helping to control her symptoms.  She feels like the gabapentin may be causing her to feel brain fog.  Discussed possibly trying her on Horizant to see if that would be more effective for her without the side effects.  Patient is going to check with her insurance company to see what the coverage is for that.  In the meantime we will continue the gabapentin and tizanidine as she has been taking it.  [place] report reviewed.  Urine tox ordered.  Discussed role of medicine, controlled substance policy, abuse potential, importance of taking medications, potential risks, benefits, and precautions to be taken.  Reviewed sleep hygiene and dietary modifications.  Follow-up in 6 months.    This note was created with voice recognition software and was not corrected for typographical or grammatical errors    OARRS:  Zoë Cardenas, DEWAYNE-CNP on 11/17/2023 11:07 AM  I have personally reviewed the OARRS report for Jennifer Juan. I have considered the risks of abuse, dependence, addiction and diversion    Is the patient prescribed a combination of a benzodiazepine and opioid?  No    Last Urine Drug Screen / ordered today: Yes  No results found for this or any previous visit (from the past 8760 hour(s)).  N/A        Controlled Substance Agreement:  Date of the Last Agreement: 11/17/2023  Reviewed Controlled Substance Agreement including but not limited to the benefits, risks, and alternatives to treatment with a Controlled Substance medication(s).    Anticonvulsant:   What is the patient's goal of therapy?  Management of symptoms  Is this being achieved with current treatment?  Yes    Activities of Daily Living:   Is your overall impression that this patient is benefiting (symptom reduction outweighs side effects) from Anticonvulsant therapy? Yes     1. Physical Functioning:  Better  2. Family Relationship: Better  3. Social Relationship: Same  4. Mood: Same  5. Sleep Patterns: Same  6. Overall Function: Better

## 2023-11-28 LAB
1OH-MIDAZOLAM UR CFM-MCNC: <25 NG/ML
6MAM UR CFM-MCNC: <25 NG/ML
7AMINOCLONAZEPAM UR CFM-MCNC: <25 NG/ML
A-OH ALPRAZ UR CFM-MCNC: <25 NG/ML
ALPRAZ UR CFM-MCNC: <25 NG/ML
CHLORDIAZEP UR CFM-MCNC: <25 NG/ML
CLONAZEPAM UR CFM-MCNC: <25 NG/ML
CODEINE UR CFM-MCNC: <50 NG/ML
DIAZEPAM UR CFM-MCNC: <25 NG/ML
EDDP UR CFM-MCNC: <25 NG/ML
FENTANYL UR CFM-MCNC: <2.5 NG/ML
HYDROCODONE CTO UR CFM-MCNC: <25 NG/ML
HYDROMORPHONE UR CFM-MCNC: <25 NG/ML
LORAZEPAM UR CFM-MCNC: <25 NG/ML
METHADONE UR CFM-MCNC: <25 NG/ML
MIDAZOLAM UR CFM-MCNC: <25 NG/ML
MORPHINE UR CFM-MCNC: <50 NG/ML
NORDIAZEPAM UR CFM-MCNC: <25 NG/ML
NORFENTANYL UR CFM-MCNC: <2.5 NG/ML
NORHYDROCODONE UR CFM-MCNC: <25 NG/ML
NOROXYCODONE UR CFM-MCNC: <25 NG/ML
NORTRAMADOL UR-MCNC: <50 NG/ML
OXAZEPAM UR CFM-MCNC: <25 NG/ML
OXYCODONE UR CFM-MCNC: <25 NG/ML
OXYMORPHONE UR CFM-MCNC: <25 NG/ML
TEMAZEPAM UR CFM-MCNC: <25 NG/ML
TRAMADOL UR CFM-MCNC: <50 NG/ML
ZOLPIDEM UR CFM-MCNC: 767 NG/ML
ZOLPIDEM UR-MCNC: <25 NG/ML

## 2024-02-27 ENCOUNTER — LAB (OUTPATIENT)
Dept: LAB | Facility: LAB | Age: 59
End: 2024-02-27
Payer: COMMERCIAL

## 2024-02-27 ENCOUNTER — OFFICE VISIT (OUTPATIENT)
Dept: PRIMARY CARE | Facility: CLINIC | Age: 59
End: 2024-02-27
Payer: COMMERCIAL

## 2024-02-27 VITALS
OXYGEN SATURATION: 93 % | HEART RATE: 69 BPM | BODY MASS INDEX: 27.15 KG/M2 | WEIGHT: 168.2 LBS | DIASTOLIC BLOOD PRESSURE: 87 MMHG | SYSTOLIC BLOOD PRESSURE: 125 MMHG

## 2024-02-27 DIAGNOSIS — R11.0 NAUSEA: ICD-10-CM

## 2024-02-27 DIAGNOSIS — K52.9 GASTROENTERITIS: Primary | ICD-10-CM

## 2024-02-27 DIAGNOSIS — K52.9 ACUTE GASTROENTERITIS: ICD-10-CM

## 2024-02-27 DIAGNOSIS — M43.10 ACQUIRED SPONDYLOLISTHESIS: ICD-10-CM

## 2024-02-27 DIAGNOSIS — K52.9 GASTROENTERITIS: ICD-10-CM

## 2024-02-27 PROBLEM — M95.8 OSTEOCHONDRAL DEFECT OF TALUS: Status: ACTIVE | Noted: 2023-10-21

## 2024-02-27 PROBLEM — M06.049: Status: RESOLVED | Noted: 2023-09-20 | Resolved: 2024-02-27

## 2024-02-27 PROBLEM — H69.90 EUSTACHIAN TUBE DISORDER: Status: ACTIVE | Noted: 2023-09-21

## 2024-02-27 LAB
BASOPHILS # BLD AUTO: 0.05 X10*3/UL (ref 0–0.1)
BASOPHILS NFR BLD AUTO: 0.9 %
EOSINOPHIL # BLD AUTO: 0.05 X10*3/UL (ref 0–0.7)
EOSINOPHIL NFR BLD AUTO: 0.9 %
ERYTHROCYTE [DISTWIDTH] IN BLOOD BY AUTOMATED COUNT: 12.4 % (ref 11.5–14.5)
HCT VFR BLD AUTO: 45.8 % (ref 36–46)
HGB BLD-MCNC: 15.5 G/DL (ref 12–16)
IMM GRANULOCYTES # BLD AUTO: 0.01 X10*3/UL (ref 0–0.7)
IMM GRANULOCYTES NFR BLD AUTO: 0.2 % (ref 0–0.9)
LYMPHOCYTES # BLD AUTO: 2.02 X10*3/UL (ref 1.2–4.8)
LYMPHOCYTES NFR BLD AUTO: 37.1 %
MCH RBC QN AUTO: 29.4 PG (ref 26–34)
MCHC RBC AUTO-ENTMCNC: 33.8 G/DL (ref 32–36)
MCV RBC AUTO: 87 FL (ref 80–100)
MONOCYTES # BLD AUTO: 0.49 X10*3/UL (ref 0.1–1)
MONOCYTES NFR BLD AUTO: 9 %
NEUTROPHILS # BLD AUTO: 2.82 X10*3/UL (ref 1.2–7.7)
NEUTROPHILS NFR BLD AUTO: 51.9 %
NRBC BLD-RTO: 0 /100 WBCS (ref 0–0)
PLATELET # BLD AUTO: 266 X10*3/UL (ref 150–450)
RBC # BLD AUTO: 5.27 X10*6/UL (ref 4–5.2)
WBC # BLD AUTO: 5.4 X10*3/UL (ref 4.4–11.3)

## 2024-02-27 PROCEDURE — 99214 OFFICE O/P EST MOD 30 MIN: CPT | Performed by: INTERNAL MEDICINE

## 2024-02-27 PROCEDURE — 85025 COMPLETE CBC W/AUTO DIFF WBC: CPT

## 2024-02-27 PROCEDURE — 36415 COLL VENOUS BLD VENIPUNCTURE: CPT

## 2024-02-27 PROCEDURE — 80053 COMPREHEN METABOLIC PANEL: CPT

## 2024-02-27 PROCEDURE — 83690 ASSAY OF LIPASE: CPT

## 2024-02-27 PROCEDURE — 1036F TOBACCO NON-USER: CPT | Performed by: INTERNAL MEDICINE

## 2024-02-27 PROCEDURE — 82150 ASSAY OF AMYLASE: CPT

## 2024-02-27 RX ORDER — ONDANSETRON 4 MG/1
4 TABLET, FILM COATED ORAL EVERY 8 HOURS PRN
Qty: 20 TABLET | Refills: 0 | Status: SHIPPED | OUTPATIENT
Start: 2024-02-27 | End: 2024-03-05

## 2024-02-27 ASSESSMENT — PATIENT HEALTH QUESTIONNAIRE - PHQ9
2. FEELING DOWN, DEPRESSED OR HOPELESS: NOT AT ALL
1. LITTLE INTEREST OR PLEASURE IN DOING THINGS: NOT AT ALL
SUM OF ALL RESPONSES TO PHQ9 QUESTIONS 1 AND 2: 0

## 2024-02-27 NOTE — PROGRESS NOTES
Subjective   Patient ID: Jennifer Juan is a 58 y.o. female who presents for Abdominal Pain (Patient went to urgent care on Sunday b/c she was having nausea and vomiting. Patient is still experiencing weakness and no appetite. Patient states that it is the same spot she had diverticulitis on in the past. ).    Pt seen for follow up from recent UC visit for generalized abdominal pain associated with nausea,vomiting and diarrhea over the weekend, denies any blood in her stool, she restarted her PPI, she no longer had any vomiting she feels slightly nauseated denies any fever or chills, last BM yesterday.  She has an appointment with her GI next week.  She feels fatigued.  Last colonoscopy 2019,next in 10 years.  She has flareup of her low back pain.         Review of Systems   HENT: Negative.     Respiratory: Negative.     Cardiovascular: Negative.    Gastrointestinal:         As HPI.   Musculoskeletal:  Positive for back pain.       Objective   /87   Pulse 69   Wt 76.3 kg (168 lb 3.2 oz)   SpO2 93%   BMI 27.15 kg/m²     Physical Exam  Constitutional:       Appearance: Normal appearance.   HENT:      Head: Normocephalic and atraumatic.   Eyes:      Extraocular Movements: Extraocular movements intact.      Pupils: Pupils are equal, round, and reactive to light.   Cardiovascular:      Rate and Rhythm: Normal rate and regular rhythm.      Heart sounds: Normal heart sounds.   Pulmonary:      Effort: Pulmonary effort is normal.      Breath sounds: Normal breath sounds. No wheezing or rhonchi.   Abdominal:      General: Abdomen is flat. Bowel sounds are normal. There is no distension.      Palpations: Abdomen is soft.      Tenderness: There is no abdominal tenderness. There is no rebound.   Musculoskeletal:         General: Normal range of motion.      Cervical back: Normal range of motion and neck supple.      Right lower leg: No edema.      Left lower leg: No edema.   Skin:     General: Skin is warm.    Neurological:      General: No focal deficit present.      Mental Status: She is alert and oriented to person, place, and time.   Psychiatric:         Mood and Affect: Mood normal.         Behavior: Behavior normal.         Assessment/Plan   Problem List Items Addressed This Visit             ICD-10-CM    Acquired spondylolisthesis M43.10     .  Continue Neurontin  Stretching exercise to the back.         Acute gastroenteritis K52.9     Stay on clear liquid diet advance as tolerated to brat diet, avoid fatty fried food or dairy product.  Order labs.  Add ondansetron for nausea keep well-hydrated.  Follow-up with the GI.         Gastroenteritis - Primary K52.9    Relevant Orders    CBC and Auto Differential (Completed)    Comprehensive Metabolic Panel (Completed)    Amylase (Completed)    Lipase (Completed)    Nausea R11.0    Relevant Medications    ondansetron (Zofran) 4 mg tablet    Other Relevant Orders    Amylase (Completed)    Lipase (Completed)

## 2024-02-28 DIAGNOSIS — E87.6 HYPOKALEMIA: Primary | ICD-10-CM

## 2024-02-28 LAB
ALBUMIN SERPL BCP-MCNC: 4.4 G/DL (ref 3.4–5)
ALP SERPL-CCNC: 57 U/L (ref 33–136)
ALT SERPL W P-5'-P-CCNC: 16 U/L (ref 7–45)
AMYLASE SERPL-CCNC: 48 U/L (ref 29–103)
ANION GAP SERPL CALC-SCNC: 15 MMOL/L (ref 10–20)
AST SERPL W P-5'-P-CCNC: 14 U/L (ref 9–39)
BILIRUB SERPL-MCNC: 0.7 MG/DL (ref 0–1.2)
BUN SERPL-MCNC: 18 MG/DL (ref 6–23)
CALCIUM SERPL-MCNC: 9.3 MG/DL (ref 8.6–10.3)
CHLORIDE SERPL-SCNC: 101 MMOL/L (ref 98–107)
CO2 SERPL-SCNC: 25 MMOL/L (ref 21–32)
CREAT SERPL-MCNC: 0.98 MG/DL (ref 0.5–1.05)
EGFRCR SERPLBLD CKD-EPI 2021: 66 ML/MIN/1.73M*2
GLUCOSE SERPL-MCNC: 84 MG/DL (ref 74–99)
LIPASE SERPL-CCNC: 29 U/L (ref 9–82)
POTASSIUM SERPL-SCNC: 3.3 MMOL/L (ref 3.5–5.3)
PROT SERPL-MCNC: 7 G/DL (ref 6.4–8.2)
SODIUM SERPL-SCNC: 138 MMOL/L (ref 136–145)

## 2024-02-28 RX ORDER — POTASSIUM CHLORIDE 20 MEQ/1
TABLET, EXTENDED RELEASE ORAL
Qty: 4 TABLET | Refills: 0 | Status: SHIPPED | OUTPATIENT
Start: 2024-02-28

## 2024-02-28 NOTE — RESULT ENCOUNTER NOTE
Please let patient know that her potassium was low this is most likely due to her recent vomiting and diarrhea, I sent prescription for potassium supplement to be taken for 4 days only.  Rest of lab results within normal

## 2024-02-29 ENCOUNTER — TELEPHONE (OUTPATIENT)
Dept: PRIMARY CARE | Facility: CLINIC | Age: 59
End: 2024-02-29
Payer: COMMERCIAL

## 2024-02-29 ASSESSMENT — ENCOUNTER SYMPTOMS
RESPIRATORY NEGATIVE: 1
BACK PAIN: 1
CARDIOVASCULAR NEGATIVE: 1
ROS GI COMMENTS: AS HPI.

## 2024-02-29 NOTE — ASSESSMENT & PLAN NOTE
Stay on clear liquid diet advance as tolerated to brat diet, avoid fatty fried food or dairy product.  Order labs.  Add ondansetron for nausea keep well-hydrated.  Follow-up with the GI.

## 2024-02-29 NOTE — TELEPHONE ENCOUNTER
I called pt, her GFR is normal, I advised her to keep well-hydrated.  She has her usual sciatica pain, on gabapentin, I advised her to do some massage therapy, and do stretching exercise, we will see her back for her physical in June.   Previously Declined (within the last year)

## 2024-02-29 NOTE — TELEPHONE ENCOUNTER
Pt called in regards to her labs. She did speak to Xenia, however she is concerned about her GFR and would like to discuss that with Dr Li. Please advise. Thank you!

## 2024-03-04 ENCOUNTER — OFFICE VISIT (OUTPATIENT)
Dept: PRIMARY CARE | Facility: CLINIC | Age: 59
End: 2024-03-04
Payer: COMMERCIAL

## 2024-03-04 VITALS
DIASTOLIC BLOOD PRESSURE: 74 MMHG | SYSTOLIC BLOOD PRESSURE: 112 MMHG | OXYGEN SATURATION: 97 % | BODY MASS INDEX: 27.93 KG/M2 | WEIGHT: 173.8 LBS | HEART RATE: 75 BPM | HEIGHT: 66 IN

## 2024-03-04 DIAGNOSIS — R20.8 BURNING SENSATION: Primary | ICD-10-CM

## 2024-03-04 DIAGNOSIS — M79.2 NEURALGIA: Primary | ICD-10-CM

## 2024-03-04 PROCEDURE — 99214 OFFICE O/P EST MOD 30 MIN: CPT | Performed by: NURSE PRACTITIONER

## 2024-03-04 PROCEDURE — 1036F TOBACCO NON-USER: CPT | Performed by: NURSE PRACTITIONER

## 2024-03-04 RX ORDER — LIDOCAINE 50 MG/G
OINTMENT TOPICAL AS NEEDED
Qty: 240 G | Refills: 1 | Status: SHIPPED | OUTPATIENT
Start: 2024-03-04 | End: 2025-03-04

## 2024-03-04 RX ORDER — LIDOCAINE 50 MG/G
OINTMENT TOPICAL AS NEEDED
Qty: 30 G | Refills: 0 | Status: SHIPPED | OUTPATIENT
Start: 2024-03-04 | End: 2025-03-04

## 2024-03-04 RX ORDER — PREDNISONE 10 MG/1
TABLET ORAL
Qty: 42 TABLET | Refills: 0 | Status: SHIPPED | OUTPATIENT
Start: 2024-03-04 | End: 2024-03-16

## 2024-03-04 RX ORDER — TRAMADOL HYDROCHLORIDE 50 MG/1
50 TABLET ORAL 2 TIMES DAILY PRN
Qty: 10 TABLET | Refills: 0 | Status: SHIPPED | OUTPATIENT
Start: 2024-03-04 | End: 2024-03-09

## 2024-03-04 NOTE — PROGRESS NOTES
"Subjective   Patient ID: Jennifer Juan is a 58 y.o. female who presents for thigh pain (Patient c/o pain on left side that radiate up into hip. Onset Thursday. Patient does not have a visible rash. ).    Presents with burning pain to her inner thigh and back. Complains of skin hurting when she touches it. She had to take off work last week as she drives a lot and the feeling of her clothes on her skin hurt.  History of herpes type2 for which she used valtrex 500 mg daily.  Last week on Thursday she started taking her valtrex 500 mg BID.  Helped some but the pain is still pretty bad.  No issues with walking or mobility.  No rash.        Review of Systems  otherwise negative aside from what was mentioned above in HPI.    Objective   /74 (BP Location: Right arm, Patient Position: Sitting)   Pulse 75   Ht 1.676 m (5' 6\")   Wt 78.8 kg (173 lb 12.8 oz)   SpO2 97%   BMI 28.05 kg/m²     Physical Exam  Constitutional:       Appearance: Normal appearance.   Cardiovascular:      Rate and Rhythm: Normal rate and regular rhythm.   Pulmonary:      Effort: Pulmonary effort is normal.   Skin:     General: Skin is warm.      Capillary Refill: Capillary refill takes less than 2 seconds.   Neurological:      General: No focal deficit present.      Mental Status: She is alert and oriented to person, place, and time. Mental status is at baseline.   Psychiatric:         Mood and Affect: Mood normal.         Behavior: Behavior normal.         Thought Content: Thought content normal.         Judgment: Judgment normal.         Assessment/Plan   Problem List Items Addressed This Visit    None  Visit Diagnoses         Codes    Neuralgia    -  Primary M79.2    Relevant Medications    predniSONE (Deltasone) 10 mg tablet    traMADol (Ultram) 50 mg tablet    lidocaine (Xylocaine) 5 % ointment               "

## 2024-03-05 DIAGNOSIS — M48.061 SPINAL STENOSIS OF LUMBAR REGION WITHOUT NEUROGENIC CLAUDICATION: ICD-10-CM

## 2024-03-05 RX ORDER — GABAPENTIN 600 MG/1
1200 TABLET ORAL 3 TIMES DAILY
Qty: 540 TABLET | Refills: 1 | Status: CANCELLED | OUTPATIENT
Start: 2024-03-05

## 2024-03-05 NOTE — PROGRESS NOTES
Patient just had a 90-day prescription filled in February.  She will get refills at her next follow-up appointment in April

## 2024-03-28 ENCOUNTER — TELEPHONE (OUTPATIENT)
Dept: PRIMARY CARE | Facility: CLINIC | Age: 59
End: 2024-03-28
Payer: COMMERCIAL

## 2024-03-28 NOTE — TELEPHONE ENCOUNTER
Patient requesting to speak with you. States her pain management provider suggested that she should think about going on Meloxicam for her bursitis in shoulder.   Patient is not sure if she wants to take the medication and would like to discuss with you. States she is worried about if she takes it, how will it affect her kidneys.     Please advise

## 2024-03-29 ENCOUNTER — OFFICE VISIT (OUTPATIENT)
Dept: PRIMARY CARE | Facility: CLINIC | Age: 59
End: 2024-03-29
Payer: COMMERCIAL

## 2024-03-29 VITALS
HEART RATE: 82 BPM | SYSTOLIC BLOOD PRESSURE: 113 MMHG | DIASTOLIC BLOOD PRESSURE: 78 MMHG | OXYGEN SATURATION: 94 % | BODY MASS INDEX: 28.7 KG/M2 | WEIGHT: 177.8 LBS

## 2024-03-29 DIAGNOSIS — Z00.00 ANNUAL PHYSICAL EXAM: Primary | ICD-10-CM

## 2024-03-29 DIAGNOSIS — M43.10 SPONDYLOLISTHESIS, UNSPECIFIED SPINAL REGION: ICD-10-CM

## 2024-03-29 DIAGNOSIS — M48.061 SPINAL STENOSIS OF LUMBAR REGION WITHOUT NEUROGENIC CLAUDICATION: ICD-10-CM

## 2024-03-29 PROBLEM — G56.00 CARPAL TUNNEL SYNDROME: Status: ACTIVE | Noted: 2024-03-29

## 2024-03-29 PROBLEM — M79.646 THUMB PAIN: Status: ACTIVE | Noted: 2024-03-29

## 2024-03-29 PROBLEM — M75.100 TEAR OF ROTATOR CUFF: Status: ACTIVE | Noted: 2024-03-29

## 2024-03-29 PROCEDURE — 1036F TOBACCO NON-USER: CPT | Performed by: INTERNAL MEDICINE

## 2024-03-29 PROCEDURE — 99213 OFFICE O/P EST LOW 20 MIN: CPT | Performed by: INTERNAL MEDICINE

## 2024-03-29 RX ORDER — MELOXICAM 15 MG/1
15 TABLET ORAL
COMMUNITY
Start: 2024-03-27

## 2024-03-29 ASSESSMENT — ENCOUNTER SYMPTOMS
GASTROINTESTINAL NEGATIVE: 1
BACK PAIN: 1

## 2024-03-29 NOTE — PROGRESS NOTES
Subjective   Patient ID: Jennifer Juan is a 58 y.o. female who presents for Discuss medication .    Patient seen today to discuss her medication prescribed by her orthopedic at [facility].  Her for her low back pain with left radiculopathy, he order MRI and she has diagnosis of degenerative disc disease of the lumbar region by her x-ray, also bursitis, she is already on Neurontin but he wants her to start meloxicam for her pain along with topical Voltaren gel, she is concerned because her father has kidney problem.  Patient has been taking Tylenol without any significant improvement, her recent CMP was normal including LFT and creatinine.  She did recover from her gastroenteritis from 2 months ago.         Review of Systems   Gastrointestinal: Negative.    Musculoskeletal:  Positive for back pain.        As HPI.       Objective   /78 (BP Location: Left arm, Patient Position: Sitting, BP Cuff Size: Large adult)   Pulse 82   Wt 80.6 kg (177 lb 12.8 oz)   SpO2 94%   BMI 28.70 kg/m²     Physical Exam  Constitutional:       Appearance: Normal appearance.   HENT:      Head: Normocephalic and atraumatic.   Eyes:      Extraocular Movements: Extraocular movements intact.      Pupils: Pupils are equal, round, and reactive to light.   Cardiovascular:      Rate and Rhythm: Normal rate and regular rhythm.      Heart sounds: Normal heart sounds.   Pulmonary:      Effort: Pulmonary effort is normal.      Breath sounds: Normal breath sounds. No wheezing or rhonchi.   Abdominal:      General: Abdomen is flat. Bowel sounds are normal. There is no distension.      Palpations: Abdomen is soft.   Musculoskeletal:      Cervical back: Normal range of motion and neck supple.      Right lower leg: No edema.      Left lower leg: No edema.      Comments: Pain over left mid thoracic area with deep palpation.   Skin:     General: Skin is warm.   Neurological:      General: No focal deficit present.      Mental Status: She is alert and  oriented to person, place, and time.   Psychiatric:         Mood and Affect: Mood normal.         Behavior: Behavior normal.         Assessment/Plan   Problem List Items Addressed This Visit             ICD-10-CM    Spinal stenosis of lumbar region M48.061     I discussed with patient that she can start taking meloxicam, and we will monitor her CMP closely it is taking regularly, she has an appointment to see me in 3 months will order her lab including a urine test  I also advised her to take it with food to prevent any GI toxicity.         Spondylolisthesis M43.10     Other Visit Diagnoses         Codes    Annual physical exam    -  Primary Z00.00    Relevant Orders    CBC    Comprehensive Metabolic Panel    Lipid Panel    TSH with reflex to Free T4 if abnormal    Urinalysis with Reflex Microscopic

## 2024-03-29 NOTE — ASSESSMENT & PLAN NOTE
I discussed with patient that she can start taking meloxicam, and we will monitor her CMP closely it is taking regularly, she has an appointment to see me in 3 months will order her lab including a urine test  I also advised her to take it with food to prevent any GI toxicity.

## 2024-05-03 ENCOUNTER — OFFICE VISIT (OUTPATIENT)
Dept: NEUROLOGY | Facility: CLINIC | Age: 59
End: 2024-05-03
Payer: COMMERCIAL

## 2024-05-03 VITALS
WEIGHT: 175 LBS | DIASTOLIC BLOOD PRESSURE: 79 MMHG | BODY MASS INDEX: 28.12 KG/M2 | SYSTOLIC BLOOD PRESSURE: 129 MMHG | HEART RATE: 75 BPM | HEIGHT: 66 IN

## 2024-05-03 DIAGNOSIS — G44.86 CERVICOGENIC HEADACHE: Primary | ICD-10-CM

## 2024-05-03 DIAGNOSIS — R20.8 BURNING SENSATION: ICD-10-CM

## 2024-05-03 DIAGNOSIS — M54.16 LUMBAR RADICULOPATHY: ICD-10-CM

## 2024-05-03 PROCEDURE — 99214 OFFICE O/P EST MOD 30 MIN: CPT | Performed by: NURSE PRACTITIONER

## 2024-05-03 PROCEDURE — 1036F TOBACCO NON-USER: CPT | Performed by: NURSE PRACTITIONER

## 2024-05-03 RX ORDER — ALPRAZOLAM 0.25 MG/1
TABLET ORAL
COMMUNITY
Start: 2024-04-05

## 2024-05-03 ASSESSMENT — ANXIETY QUESTIONNAIRES
3. WORRYING TOO MUCH ABOUT DIFFERENT THINGS: NOT AT ALL
6. BECOMING EASILY ANNOYED OR IRRITABLE: NOT AT ALL
GAD7 TOTAL SCORE: 0
IF YOU CHECKED OFF ANY PROBLEMS ON THIS QUESTIONNAIRE, HOW DIFFICULT HAVE THESE PROBLEMS MADE IT FOR YOU TO DO YOUR WORK, TAKE CARE OF THINGS AT HOME, OR GET ALONG WITH OTHER PEOPLE: NOT DIFFICULT AT ALL
7. FEELING AFRAID AS IF SOMETHING AWFUL MIGHT HAPPEN: NOT AT ALL
1. FEELING NERVOUS, ANXIOUS, OR ON EDGE: NOT AT ALL
5. BEING SO RESTLESS THAT IT IS HARD TO SIT STILL: NOT AT ALL
4. TROUBLE RELAXING: NOT AT ALL
2. NOT BEING ABLE TO STOP OR CONTROL WORRYING: NOT AT ALL

## 2024-05-03 ASSESSMENT — ENCOUNTER SYMPTOMS
OCCASIONAL FEELINGS OF UNSTEADINESS: 0
LOSS OF SENSATION IN FEET: 0
DEPRESSION: 0

## 2024-05-03 ASSESSMENT — PAIN SCALES - GENERAL: PAINLEVEL: 2

## 2024-05-03 NOTE — PROGRESS NOTES
Patient being assessed today for follow-up of cervicogenic headache, lumbar radiculopathy.  She is reporting some side effects from the gabapentin to include lethargy.  Also having some increased pain left lower extremity.  Would like to switch her over to Horizant to see if that is more effective for her symptoms without the side effects.  OARRS report reviewed.  Discussed role of medicine, controlled substance policy, abuse potential, importance of taking medications, potential risks, benefits, and precautions to be taken.  Reviewed sleep hygiene and dietary modifications.  Follow-up in 2-6 months.    This note was created with voice recognition software and was not corrected for typographical or grammatical errors    OARRS:  Zoë Cardenas, APRN-CNP on 5/3/2024  9:35 AM  I have personally reviewed the OARRS report for Jennifer Juan. I have considered the risks of abuse, dependence, addiction and diversion    Is the patient prescribed a combination of a benzodiazepine and opioid?  No    Last Urine Drug Screen / ordered today: No  Recent Results (from the past 8760 hour(s))   Confirmation Opiate/Opioid/Benzo Prescription Compliance    Collection Time: 11/17/23 11:54 AM   Result Value Ref Range    Clonazepam <25 <25 ng/mL    7-Aminoclonazepam <25 <25 ng/mL    Alprazolam <25 <25 ng/mL    Alpha-Hydroxyalprazolam <25 <25 ng/mL    Midazolam <25 <25 ng/mL    Alpha-Hydroxymidazolam <25 <25 ng/mL    Chlordiazepoxide <25 <25 ng/mL    Diazepam <25 <25 ng/mL    Nordiazepam <25 <25 ng/mL    Temazepam <25 <25 ng/mL    Oxazepam <25 <25 ng/mL    Lorazepam <25 <25 ng/mL    Methadone <25 <25 ng/mL    EDDP <25 <25 ng/mL    6-Acetylmorphine <25 <25 ng/mL    Codeine <50 <50 ng/mL    Hydrocodone <25 <25 ng/mL    Hydromorphone <25 <25 ng/mL    Morphine  <50 <50 ng/mL    Norhydrocodone <25 <25 ng/mL    Noroxycodone <25 <25 ng/mL    Oxycodone <25 <25 ng/mL    Oxymorphone <25 <25 ng/mL    Fentanyl <2.5 <2.5 ng/mL    Norfentanyl <2.5 <2.5  ng/mL    Tramadol <50 <50 ng/mL    O-Desmethyltramadol <50 <50 ng/mL    Zolpidem <25 <25 ng/mL    Zolpidem Metabolite (ZCA) 767 (H) <25 ng/mL   Screen Opiate/Opioid/Benzo Prescription Compliance    Collection Time: 11/17/23 11:54 AM   Result Value Ref Range    Creatinine, Urine Random 19.6 (L) 20.0 - 320.0 mg/dL    Amphetamine Screen, Urine Presumptive Negative Presumptive Negative    Barbiturate Screen, Urine Presumptive Negative Presumptive Negative    Cannabinoid Screen, Urine Presumptive Negative Presumptive Negative    Cocaine Metabolite Screen, Urine Presumptive Negative Presumptive Negative    PCP Screen, Urine Presumptive Negative Presumptive Negative     Results are as expected.         Controlled Substance Agreement:  Date of the Last Agreement: 11/17/2023  Reviewed Controlled Substance Agreement including but not limited to the benefits, risks, and alternatives to treatment with a Controlled Substance medication(s).    Anticonvulsant:   What is the patient's goal of therapy?  Management of symptoms  Is this being achieved with current treatment?  Somewhat    Activities of Daily Living:   Is your overall impression that this patient is benefiting (symptom reduction outweighs side effects) from Anticonvulsant therapy?  Changing therapy    1. Physical Functioning: Better  2. Family Relationship: Same  3. Social Relationship: Same  4. Mood: Same  5. Sleep Patterns: Same  6. Overall Function: Same

## 2024-05-06 DIAGNOSIS — M48.061 SPINAL STENOSIS OF LUMBAR REGION WITHOUT NEUROGENIC CLAUDICATION: ICD-10-CM

## 2024-05-10 DIAGNOSIS — M48.061 SPINAL STENOSIS OF LUMBAR REGION WITHOUT NEUROGENIC CLAUDICATION: ICD-10-CM

## 2024-05-10 RX ORDER — GABAPENTIN 600 MG/1
1200 TABLET ORAL 3 TIMES DAILY
Qty: 540 TABLET | Refills: 0 | Status: SHIPPED | OUTPATIENT
Start: 2024-05-10 | End: 2024-05-10 | Stop reason: SDUPTHER

## 2024-05-10 RX ORDER — GABAPENTIN 600 MG/1
1200 TABLET ORAL 3 TIMES DAILY
Qty: 540 TABLET | Refills: 0 | Status: SHIPPED | OUTPATIENT
Start: 2024-05-10

## 2024-05-14 DIAGNOSIS — G62.9 NEUROPATHY: Primary | ICD-10-CM

## 2024-05-14 RX ORDER — GABAPENTIN 900 MG/1
1800 TABLET, FILM COATED ORAL
Qty: 60 TABLET | Refills: 3 | Status: SHIPPED | OUTPATIENT
Start: 2024-05-14

## 2024-05-17 ENCOUNTER — APPOINTMENT (OUTPATIENT)
Dept: NEUROLOGY | Facility: CLINIC | Age: 59
End: 2024-05-17
Payer: COMMERCIAL

## 2024-06-06 ENCOUNTER — APPOINTMENT (OUTPATIENT)
Dept: NEUROLOGY | Facility: CLINIC | Age: 59
End: 2024-06-06
Payer: COMMERCIAL

## 2024-06-24 ENCOUNTER — HOSPITAL ENCOUNTER (OUTPATIENT)
Dept: RADIOLOGY | Facility: CLINIC | Age: 59
Discharge: HOME | End: 2024-06-24
Payer: COMMERCIAL

## 2024-06-24 ENCOUNTER — APPOINTMENT (OUTPATIENT)
Dept: PRIMARY CARE | Facility: CLINIC | Age: 59
End: 2024-06-24
Payer: COMMERCIAL

## 2024-06-24 VITALS
BODY MASS INDEX: 28.59 KG/M2 | DIASTOLIC BLOOD PRESSURE: 76 MMHG | TEMPERATURE: 97.9 F | SYSTOLIC BLOOD PRESSURE: 114 MMHG | WEIGHT: 171.6 LBS | HEIGHT: 65 IN | OXYGEN SATURATION: 94 % | HEART RATE: 79 BPM

## 2024-06-24 DIAGNOSIS — Z00.00 ANNUAL PHYSICAL EXAM: ICD-10-CM

## 2024-06-24 DIAGNOSIS — R21 RASH: Primary | ICD-10-CM

## 2024-06-24 DIAGNOSIS — F41.8 DEPRESSION WITH ANXIETY: ICD-10-CM

## 2024-06-24 DIAGNOSIS — S00.83XA CONTUSION OF OTHER PART OF HEAD, INITIAL ENCOUNTER: ICD-10-CM

## 2024-06-24 DIAGNOSIS — G44.311 INTRACTABLE ACUTE POST-TRAUMATIC HEADACHE: ICD-10-CM

## 2024-06-24 DIAGNOSIS — Z12.31 VISIT FOR SCREENING MAMMOGRAM: ICD-10-CM

## 2024-06-24 PROBLEM — S00.10XA CONTUSION OF PERIOCULAR REGION: Status: ACTIVE | Noted: 2024-06-24

## 2024-06-24 PROCEDURE — 99396 PREV VISIT EST AGE 40-64: CPT | Performed by: INTERNAL MEDICINE

## 2024-06-24 PROCEDURE — 70450 CT HEAD/BRAIN W/O DYE: CPT | Performed by: RADIOLOGY

## 2024-06-24 PROCEDURE — 70450 CT HEAD/BRAIN W/O DYE: CPT

## 2024-06-24 PROCEDURE — 1036F TOBACCO NON-USER: CPT | Performed by: INTERNAL MEDICINE

## 2024-06-24 RX ORDER — HYDROGEN PEROXIDE 3 %
20 SOLUTION, NON-ORAL MISCELLANEOUS
COMMUNITY
Start: 2024-05-23

## 2024-06-24 RX ORDER — MOMETASONE FUROATE 1 MG/G
CREAM TOPICAL DAILY
Qty: 15 G | Refills: 0 | Status: SHIPPED | OUTPATIENT
Start: 2024-06-24 | End: 2024-07-15

## 2024-06-24 ASSESSMENT — ENCOUNTER SYMPTOMS
RESPIRATORY NEGATIVE: 1
EYES NEGATIVE: 1
HEMATOLOGIC/LYMPHATIC NEGATIVE: 1
GASTROINTESTINAL NEGATIVE: 1
ALLERGIC/IMMUNOLOGIC NEGATIVE: 1
ENDOCRINE NEGATIVE: 1
CONSTITUTIONAL NEGATIVE: 1
ROS SKIN COMMENTS: AS HPI
HEADACHES: 1
CARDIOVASCULAR NEGATIVE: 1

## 2024-06-24 NOTE — ASSESSMENT & PLAN NOTE
Due to persistent headache post confusion, I am sending patient for stat CT head to rule out any intracranial bleeding.
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
## 2024-06-24 NOTE — ASSESSMENT & PLAN NOTE
Complete physical examination completed today.  Advised to keep a heart healthy, low-fat diet as recommended diet is the Mediterranean diet.  Advised to exercise regularly for 30 minutes daily 5 days a week and maintain 150 minutes of exercise per week.  Discussed age-appropriate cancer screening,Immunization and recommendation were given.  Advised on regular eye and dental visit.  Advised on staying well-hydrated.  Patient to return for Tdap and Shingrix as a nurse visit.

## 2024-07-09 ENCOUNTER — TELEPHONE (OUTPATIENT)
Dept: PRIMARY CARE | Facility: CLINIC | Age: 59
End: 2024-07-09
Payer: COMMERCIAL

## 2024-07-09 ENCOUNTER — HOSPITAL ENCOUNTER (OUTPATIENT)
Dept: RADIOLOGY | Facility: CLINIC | Age: 59
Discharge: HOME | End: 2024-07-09
Payer: COMMERCIAL

## 2024-07-09 VITALS — WEIGHT: 170 LBS | BODY MASS INDEX: 26.68 KG/M2 | HEIGHT: 67 IN

## 2024-07-09 DIAGNOSIS — Z12.31 VISIT FOR SCREENING MAMMOGRAM: ICD-10-CM

## 2024-07-09 DIAGNOSIS — G44.86 CERVICOGENIC HEADACHE: Primary | ICD-10-CM

## 2024-07-09 PROCEDURE — 77063 BREAST TOMOSYNTHESIS BI: CPT | Performed by: RADIOLOGY

## 2024-07-09 PROCEDURE — 77067 SCR MAMMO BI INCL CAD: CPT | Performed by: RADIOLOGY

## 2024-07-09 PROCEDURE — 77067 SCR MAMMO BI INCL CAD: CPT

## 2024-07-09 NOTE — TELEPHONE ENCOUNTER
Patient advised she is still getting headaches after head trauma approximately 3 weeks. Please call [phone]

## 2024-07-10 RX ORDER — METHYLPREDNISOLONE 4 MG/1
TABLET ORAL
Qty: 21 TABLET | Refills: 0 | Status: SHIPPED | OUTPATIENT
Start: 2024-07-10 | End: 2024-07-17

## 2024-07-16 ENCOUNTER — TELEPHONE (OUTPATIENT)
Dept: PRIMARY CARE | Facility: CLINIC | Age: 59
End: 2024-07-16
Payer: COMMERCIAL

## 2024-07-16 NOTE — TELEPHONE ENCOUNTER
Pt called stating due to head trauma, she is still having issues. Can't get in to see neurologist for 2 1/2 month. She would like to get in and see Dr. Li.      Please call pt at  [phone]

## 2024-07-17 ENCOUNTER — OFFICE VISIT (OUTPATIENT)
Dept: PRIMARY CARE | Facility: CLINIC | Age: 59
End: 2024-07-17
Payer: COMMERCIAL

## 2024-07-17 VITALS
WEIGHT: 170.6 LBS | HEART RATE: 69 BPM | OXYGEN SATURATION: 95 % | TEMPERATURE: 97.5 F | BODY MASS INDEX: 26.72 KG/M2 | SYSTOLIC BLOOD PRESSURE: 110 MMHG | DIASTOLIC BLOOD PRESSURE: 78 MMHG

## 2024-07-17 DIAGNOSIS — S06.0X0A CONCUSSION WITHOUT LOSS OF CONSCIOUSNESS, INITIAL ENCOUNTER: Primary | ICD-10-CM

## 2024-07-17 PROCEDURE — 1036F TOBACCO NON-USER: CPT | Performed by: NURSE PRACTITIONER

## 2024-07-17 PROCEDURE — 99214 OFFICE O/P EST MOD 30 MIN: CPT | Performed by: NURSE PRACTITIONER

## 2024-07-17 NOTE — ASSESSMENT & PLAN NOTE
After sustaining a concussion, the brain cells go through an inflammatory cascade and microscopic swelling that causes symptoms such as headaches, trouble with concentration, irritability, sensitivity to light/sound. It is highly important to give the brain as much rest as possible after a concussion to encourage healing. Brain rest involves physical rest, avoidance of strenuous exercise and avoiding mentally taxing activities such as homework/computer use. It is recommended that you limit screen time (use of television, computers, tablets, etc) to under 2 hours per day, and when unavoidable in 15 minute increments. Audio books and music are fine, without restriction. Drink plenty of fluids and eat high anti-oxidant foods such as berries and green tea. Take a good quality multivitamin. Very gradually return to your previous activities over the course of several weeks. An active recovery is best involving low impact exercise such as walking, swimming, stretching. If symptoms increase, reduce your activity level and increase brain rest.   If there is trouble sleeping melatonin can be tried, and the dose is 5mg taken several hours before bedtime.  To help promote brain healing, it is recommended to take vitamin B2 (riboflavin) 50mg daily.
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
## 2024-08-02 ENCOUNTER — LAB (OUTPATIENT)
Dept: LAB | Facility: LAB | Age: 59
End: 2024-08-02
Payer: COMMERCIAL

## 2024-08-02 DIAGNOSIS — Z00.00 ANNUAL PHYSICAL EXAM: ICD-10-CM

## 2024-08-02 LAB
25(OH)D3 SERPL-MCNC: 38 NG/ML (ref 30–100)
ALBUMIN SERPL BCP-MCNC: 4.1 G/DL (ref 3.4–5)
ALP SERPL-CCNC: 54 U/L (ref 33–136)
ALT SERPL W P-5'-P-CCNC: 11 U/L (ref 7–45)
ANION GAP SERPL CALC-SCNC: 13 MMOL/L (ref 10–20)
AST SERPL W P-5'-P-CCNC: 11 U/L (ref 9–39)
BILIRUB SERPL-MCNC: 0.7 MG/DL (ref 0–1.2)
BUN SERPL-MCNC: 15 MG/DL (ref 6–23)
CALCIUM SERPL-MCNC: 9.7 MG/DL (ref 8.6–10.6)
CHLORIDE SERPL-SCNC: 107 MMOL/L (ref 98–107)
CHOLEST SERPL-MCNC: 220 MG/DL (ref 0–199)
CHOLESTEROL/HDL RATIO: 3.8
CO2 SERPL-SCNC: 26 MMOL/L (ref 21–32)
CREAT SERPL-MCNC: 0.86 MG/DL (ref 0.5–1.05)
EGFRCR SERPLBLD CKD-EPI 2021: 77 ML/MIN/1.73M*2
ERYTHROCYTE [DISTWIDTH] IN BLOOD BY AUTOMATED COUNT: 12.6 % (ref 11.5–14.5)
EST. AVERAGE GLUCOSE BLD GHB EST-MCNC: 91 MG/DL
GLUCOSE SERPL-MCNC: 87 MG/DL (ref 74–99)
HBA1C MFR BLD: 4.8 %
HCT VFR BLD AUTO: 43.3 % (ref 36–46)
HDLC SERPL-MCNC: 58.4 MG/DL
HGB BLD-MCNC: 14.5 G/DL (ref 12–16)
LDLC SERPL CALC-MCNC: 139 MG/DL
MCH RBC QN AUTO: 29.7 PG (ref 26–34)
MCHC RBC AUTO-ENTMCNC: 33.5 G/DL (ref 32–36)
MCV RBC AUTO: 89 FL (ref 80–100)
NON HDL CHOLESTEROL: 162 MG/DL (ref 0–149)
NRBC BLD-RTO: 0 /100 WBCS (ref 0–0)
PLATELET # BLD AUTO: 238 X10*3/UL (ref 150–450)
POTASSIUM SERPL-SCNC: 4 MMOL/L (ref 3.5–5.3)
PROT SERPL-MCNC: 6.4 G/DL (ref 6.4–8.2)
RBC # BLD AUTO: 4.89 X10*6/UL (ref 4–5.2)
SODIUM SERPL-SCNC: 142 MMOL/L (ref 136–145)
TRIGL SERPL-MCNC: 113 MG/DL (ref 0–149)
TSH SERPL-ACNC: 1.51 MIU/L (ref 0.44–3.98)
VLDL: 23 MG/DL (ref 0–40)
WBC # BLD AUTO: 4.3 X10*3/UL (ref 4.4–11.3)

## 2024-08-02 PROCEDURE — 80061 LIPID PANEL: CPT

## 2024-08-02 PROCEDURE — 82306 VITAMIN D 25 HYDROXY: CPT

## 2024-08-02 PROCEDURE — 85027 COMPLETE CBC AUTOMATED: CPT

## 2024-08-02 PROCEDURE — 36415 COLL VENOUS BLD VENIPUNCTURE: CPT

## 2024-08-02 PROCEDURE — 84443 ASSAY THYROID STIM HORMONE: CPT

## 2024-08-02 PROCEDURE — 83036 HEMOGLOBIN GLYCOSYLATED A1C: CPT

## 2024-08-02 PROCEDURE — 80053 COMPREHEN METABOLIC PANEL: CPT

## 2024-08-06 ENCOUNTER — HOSPITAL ENCOUNTER (OUTPATIENT)
Dept: RADIOLOGY | Facility: EXTERNAL LOCATION | Age: 59
Discharge: HOME | End: 2024-08-06

## 2024-08-06 ENCOUNTER — OFFICE VISIT (OUTPATIENT)
Dept: ORTHOPEDIC SURGERY | Facility: CLINIC | Age: 59
End: 2024-08-06
Payer: COMMERCIAL

## 2024-08-06 DIAGNOSIS — M67.951 TENDINOPATHY OF RIGHT GLUTEUS MEDIUS: Primary | ICD-10-CM

## 2024-08-06 DIAGNOSIS — M25.551 RIGHT HIP PAIN: ICD-10-CM

## 2024-08-06 PROCEDURE — 99213 OFFICE O/P EST LOW 20 MIN: CPT | Performed by: FAMILY MEDICINE

## 2024-08-06 PROCEDURE — 20551 NJX 1 TENDON ORIGIN/INSJ: CPT | Performed by: FAMILY MEDICINE

## 2024-08-06 PROCEDURE — 76942 ECHO GUIDE FOR BIOPSY: CPT | Performed by: FAMILY MEDICINE

## 2024-08-06 PROCEDURE — 1036F TOBACCO NON-USER: CPT | Performed by: FAMILY MEDICINE

## 2024-08-06 RX ORDER — LIDOCAINE HYDROCHLORIDE 10 MG/ML
2 INJECTION INFILTRATION; PERINEURAL
Status: COMPLETED | OUTPATIENT
Start: 2024-08-06 | End: 2024-08-06

## 2024-08-06 RX ORDER — TRIAMCINOLONE ACETONIDE 40 MG/ML
80 INJECTION, SUSPENSION INTRA-ARTICULAR; INTRAMUSCULAR
Status: COMPLETED | OUTPATIENT
Start: 2024-08-06 | End: 2024-08-06

## 2024-08-06 ASSESSMENT — PAIN DESCRIPTION - DESCRIPTORS: DESCRIPTORS: PRESSURE

## 2024-08-06 ASSESSMENT — PAIN - FUNCTIONAL ASSESSMENT: PAIN_FUNCTIONAL_ASSESSMENT: 0-10

## 2024-08-06 ASSESSMENT — PAIN SCALES - GENERAL: PAINLEVEL_OUTOF10: 2

## 2024-08-06 NOTE — PROGRESS NOTES
R Hip    Subjective   Jennifer Juan is a 58 y.o. female who presents for Pain of the Right Hip    2024 she was dancing. No acute injury or trauma. Locating glutes, greater trochanter. Worse with going up and down the stairs, sitting to standing especially in and out of car, lying on R side. Difficulty sleeping.  Better nothing  Massage, ice, heat, tylenol, advil as needed  No formal physical therapy  Denies numbness, tingling.  Never had surgery of R hip.    ROS: All pertinent positive symptoms are included in the history of present illness.    All other systems have been reviewed and are negative and noncontributory to this patient's current ailments.    Objective     There were no vitals filed for this visit.    General/Constitutional: No apparent distress. Well-nourished and well developed.  Head: Normocephalic, Atraumatic.   Eyes: EOMI.  Vascular: No edema, swelling or tenderness, except as noted in detailed exam.  Respiratory: Non-labored breathing.  Integumentary: No impressive skin lesions present, except as noted in detailed exam.  Neurological: Alert and oriented.  Psychological:  Normal mood and affect.  Musculoskeletal: Normal, except as noted in detailed exam.    Right hip exam  Inspection: No erythema, swelling, ecchymosis, atrophy  Palpation: Tenderness to palpation of gluteus medius, greater trochanter.  No tenderness to palpation over groin or SI joint.  Range of motion: Full hip flexion, abduction, adduction  Strength: 5 out of 5 hip flexion, abduction, adduction  Special tests: Pain with resisted hip abduction and extension, negative logroll, negative FADIR, negative POOJA, negative Stinchfield    Imagin2023 right hip x-ray: No acute fracture or dislocation.  Degenerative changes of hip joint medially.  Otherwise unremarkable.    Patient ID: Jenniefr Juan is a 58 y.o. female.    L Inj/Asp: R greater trochanteric bursa on 2024 12:15 PM  Indications: pain  Details: 22 G needle,  ultrasound-guided lateral approach  Medications: 80 mg triamcinolone acetonide 40 mg/mL; 2 mL lidocaine 10 mg/mL (1 %)  Outcome: tolerated well, no immediate complications    Ultrasound-guided R gluteus medius tendon sheath injection. The patient was placed in the L lateral decubitus position. The R hip was identified. The curved ultrasound transducer was placed over the greater trochanteric prominence. The greater trochanteric bursa was identified and there was no fluid within it. The area of injection was cleaned with a Betadine prep, and ethyl chloride spray was used to numb the skin. A 22-gauge 2-1/2 inch spinal needle was placed into the glutues medius tendon sheath under ultrasound guidance.  The needle tip was visualized throughout.  80mg of Kenalog and 2mL of 1% lidocaine was  injected into the tendon sheath. The fluid flowed freely without obstruction and the entire contents of the syringe was injected. The needle was then removed, the areas cleaned with alcohol prep, and a sterile Band-Aid was placed. The patient tolerated procedure well. There were no complications. Pictures were taken throughout with the ultrasound machine and stored for review at a later time if needed.    R gluteus medius tendonitis status post ultrasound-guided R gluteus medius tendon sheath steroid injection. she was educated on the signs and symptoms of local reaction and infection and should call the office if she experiences any of these. she should take it easy on the hip for the next 24 to 48 hours, rest, ice, and anti-inflammatories. After that, she can return to her normal activity.  she understands that this is not a cure, but an attempt to buy some time, decrease her discomfort, and slow the arthritic process. she may ultimately require surgery, but we will exhaust all of our conservative treatment options prior to that. she will follow-up as needed. All of her questions were answered and she agrees with the treatment  plan.  Procedure, treatment alternatives, risks and benefits explained, specific risks discussed. Consent was given by the patient. Immediately prior to procedure a time out was called to verify the correct patient, procedure, equipment, support staff and site/side marked as required. Patient was prepped and draped in the usual sterile fashion.         Assessment/Plan   Problem List Items Addressed This Visit          Musculoskeletal and Injuries    Right hip pain     58-year-old female presenting with right hip pain.  Incident 7/4/2024 when she was dancing noting gluteal and greater trochanter pain that is worse with going up and down stairs and getting in and out of cars and lying on that right side with difficulty sleeping.  Physical exam revealed tenderness to palpation over gluteus medius and greater trochanter with pain with resisted hip abduction and extension.  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  Discussed treatment options including Tylenol, NSAIDs, physical therapy, injections. With shared decision making,  -Proceeded with ultrasound-guided gluteus medius tendon sheath steroid injection (Kenalog).  - Referred to physical therapy for glutes medius strengthening as well as hip abduction and core strengthening    Questions and concerns were addressed.  Follow-up as needed.    ** Please excuse any errors in grammar or translation related to this dictation. Voice recognition software was utilized to prepare this document. **    Roberto Castellon M.D.  Clinical , Division of Sports Medicine  Primary Care Sports Medicine  Department of Orthopedic Surgery  [facility] [place]

## 2024-08-09 ENCOUNTER — EVALUATION (OUTPATIENT)
Dept: PHYSICAL THERAPY | Facility: CLINIC | Age: 59
End: 2024-08-09
Payer: COMMERCIAL

## 2024-08-09 DIAGNOSIS — S06.0X0A CONCUSSION WITHOUT LOSS OF CONSCIOUSNESS, INITIAL ENCOUNTER: Primary | ICD-10-CM

## 2024-08-09 DIAGNOSIS — M25.551 RIGHT HIP PAIN: ICD-10-CM

## 2024-08-09 PROCEDURE — 97161 PT EVAL LOW COMPLEX 20 MIN: CPT | Mod: GP | Performed by: PHYSICAL THERAPIST

## 2024-08-09 PROCEDURE — 97110 THERAPEUTIC EXERCISES: CPT | Mod: GP | Performed by: PHYSICAL THERAPIST

## 2024-08-09 ASSESSMENT — PATIENT HEALTH QUESTIONNAIRE - PHQ9
1. LITTLE INTEREST OR PLEASURE IN DOING THINGS: NOT AT ALL
2. FEELING DOWN, DEPRESSED OR HOPELESS: NOT AT ALL
SUM OF ALL RESPONSES TO PHQ9 QUESTIONS 1 AND 2: 0

## 2024-08-09 ASSESSMENT — PAIN SCALES - GENERAL: PAINLEVEL_OUTOF10: 3

## 2024-08-09 ASSESSMENT — PAIN - FUNCTIONAL ASSESSMENT: PAIN_FUNCTIONAL_ASSESSMENT: 0-10

## 2024-08-09 ASSESSMENT — ENCOUNTER SYMPTOMS
OCCASIONAL FEELINGS OF UNSTEADINESS: 0
LOSS OF SENSATION IN FEET: 0
DEPRESSION: 0

## 2024-08-09 NOTE — PROGRESS NOTES
Physical Therapy Evaluation and Treatment      Patient Name: Jennifer Juan  MRN: [id number]  Today's Date: 8/9/2024  Visit #1  Time Calculation  Start Time: 1020  Stop Time: 1120  Time Calculation (min): 60 min    Insurance:  Visit Limit: 60  Date Range: No auth  Co-Pay: $30    Assessment:  Patient is presenting today after concussion without LOC and right hip pain. Examination findings are consistent with gluteus medius tendinopathy, and cervicogenic, headache, and cognitive fatigue trajectories of concussion. Patient will benefit from physical therapy services to improve listed impairments. Initiated treatment today to address these impairments.    Patient with the following impairments: decreased muscle performance, decreased ROM, decreased activity tolerance, pain, participation restrictions, and difficulty with ADL completion    Patient's response to session: Decreased pain, Increased ROM/joint mobility, Increase motor control, and Increased knowledge and understanding    Plan:  Treatment/Interventions: Biofeedback, Cryotherapy, Dry needling, Education/ Instruction, Hot pack, Manual therapy, Self care/ home management, Taping techniques, Therapeutic activities, Therapeutic exercises  PT Plan: Skilled PT  PT Frequency: 1 time per week  Duration: 12 weeks  Onset Date: 07/02/24  Rehab Potential: Good  Plan of Care Agreement: Patient    Current Problem:   1. Concussion without loss of consciousness, initial encounter  Referral to Concussion Specialist    Follow Up In Physical Therapy      2. Right hip pain  Follow Up In Physical Therapy          Subjective   Patient presenting today with right hip pain and concussion without LOC. This started about 6 weeks ago after hitting head on pole while walking. She is doing better overall, though still experiencing headaches and cognitive function impairments. She also has had an increase in right hip pain that started without mechanism of injury. She received a cortisone  injection by Dr. Castellon recently. Pain is worse with walking and start up. Pain is better with rest and general movement. Patient denies numbness/tingling and falls. Patient wishes to eliminate pain.    Pain Assessment: 0-10  0-10 (Numeric) Pain Score: 3    General  Referred By: Concepcion Flores, ROSSI, and Dr. Castellon    Precautions  STEADI Fall Risk Score (The score of 4 or more indicates an increased risk of falling): 0  Precautions Comment: No contact sports    Objective   Cervical AROM  Flexion: No loss  Extension: Mod loss  Rotation L/R: Min loss/Min loss  Side Bending L/R: Min loss/Min loss     Shoulder AROM (L/R)  Flexion: 170°/170°  Abduction: 170°/170°  Extension: 60°/60°  External Rotation: 90°/90°  Internal rotation: 70°/70°    Longus coli testing (Male normal 38.9 sec, Female 29.4 sec): 8 seconds    Joint mobility testing (normal unless otherwise noted below)  C0-1:  C1-3: hypo  C3-6:   C7-T5: hypo    Number of Symptoms: 11/22  Symptom Severity Score: 18/132  Do symptoms increase with physical activity?: No  Do symptoms increase with mental activity?: Yes  Percentage of normal: 75%  Orientation: 5/5  Immediate Memory: 15/15  Concentration: 2/5  Neuro Screen: Negative  Balance Exam Errors: 4/30    Hip ROM (L/R)  Flexion: 125°/125°  Abduction: 45°/45°  Extension: 10°/10°  External Rotation: 45°/45°  Internal rotation: 45°/45°    Specific Lower Extremity MMT (L/R)  Gluteus Alcon (prone): 4-/5, 4-/5  Hip External Rotation: 4/5, 4/5  Gluteus Medius: 4/5, 4-/5 - pain on R    Special Tests  Stork Sign: Negative  Frank Test: Negative  Straight Leg Raise: Negative  Slump Test: Negative  Sacral Thrust: Positive  FADDIR: Positive  Hamstring Length: >91    Gait: unremarkable    Outcome Measures:  Concussion VOMS (Vestibular Oculomotor Motor Senitivity Assessment)  Concussion VOMS: yes  Baseline headache: 3  Baseline dizziness: 0  Baseline nausea: 0  Baseline fogginess: 2  Smooth Pursuits comment:  baseline  Saccades Horizontal comment: baseline  Saccades Vertical comment: baseline  VOR Horizontal return to baseline: baseline  VOR Vertical return to baseline: increased headache  VMS Test comment: baseline  Other Measures  Lower Extremity Funtional Score (LEFS): 39     Treatments:  Therapeutic Exercise (81178): 27 minutes  HEP review  Clamshell isometric tendon loading*  Supine chin tuck*  Cervical rotation snag*  Long discussion on concussion rehab    Education and discussion on HEP and treatment regarding the benefits related to current condition, POC, pathophysiology, and precautions    *added to HEP    Goals:  Patient will improve Lower Extremity Functional Scale score to meet minimal detectable change of improvement to improve performance of ADLs.    Patient will be independent with home exercise program for proper self-management of condition.    Patient will improve active range of motion in deficit areas for ADL completion.    Patient will improve strength in deficit areas so patient can work with less pain.    Patient will walk without pain.    Patient will work without pain.    Patient will improve Symptoms score to meet minimal detectable change of improvement to improve performance of ADLs.

## 2024-08-12 DIAGNOSIS — M48.061 SPINAL STENOSIS OF LUMBAR REGION WITHOUT NEUROGENIC CLAUDICATION: ICD-10-CM

## 2024-08-13 DIAGNOSIS — G62.9 NEUROPATHY: ICD-10-CM

## 2024-08-13 NOTE — TELEPHONE ENCOUNTER
Patient being assessed today for follow-up of cervicogenic headache, lumbar radiculopathy.  She is reporting some side effects from the gabapentin to include lethargy.  Also having some increased pain left lower extremity.  Would like to switch her over to Horizant to see if that is more effective for her symptoms without the side effects.  [place] report reviewed.  Discussed role of medicine, controlled substance policy, abuse potential, importance of taking medications, potential risks, benefits, and precautions to be taken.  Reviewed sleep hygiene and dietary modifications.  Follow-up in 2-6 months.

## 2024-08-14 RX ORDER — GABAPENTIN 900 MG/1
1800 TABLET, FILM COATED ORAL
Qty: 60 TABLET | Refills: 3 | Status: SHIPPED | OUTPATIENT
Start: 2024-08-14

## 2024-08-14 NOTE — TELEPHONE ENCOUNTER
Can you please find out if she is taking the Gralise or the generic gabapentin?  I already sent a refill for the Gralise...

## 2024-08-15 RX ORDER — GABAPENTIN 600 MG/1
1200 TABLET ORAL 3 TIMES DAILY
Qty: 540 TABLET | Refills: 0 | Status: SHIPPED | OUTPATIENT
Start: 2024-08-15

## 2024-09-04 ENCOUNTER — APPOINTMENT (OUTPATIENT)
Dept: PHYSICAL THERAPY | Facility: CLINIC | Age: 59
End: 2024-09-04
Payer: COMMERCIAL

## 2024-09-04 ENCOUNTER — TREATMENT (OUTPATIENT)
Dept: PHYSICAL THERAPY | Facility: CLINIC | Age: 59
End: 2024-09-04
Payer: COMMERCIAL

## 2024-09-04 DIAGNOSIS — S06.0X0D CONCUSSION WITHOUT LOSS OF CONSCIOUSNESS, SUBSEQUENT ENCOUNTER: Primary | ICD-10-CM

## 2024-09-04 DIAGNOSIS — M25.551 RIGHT HIP PAIN: ICD-10-CM

## 2024-09-04 PROCEDURE — 97110 THERAPEUTIC EXERCISES: CPT | Mod: GP | Performed by: PHYSICAL THERAPIST

## 2024-09-04 ASSESSMENT — PAIN SCALES - GENERAL: PAINLEVEL_OUTOF10: 1

## 2024-09-04 ASSESSMENT — PAIN - FUNCTIONAL ASSESSMENT: PAIN_FUNCTIONAL_ASSESSMENT: 0-10

## 2024-09-04 NOTE — PROGRESS NOTES
Physical Therapy Treatment      Patient Name: Jennifer Juan  MRN: [id number]  Today's Date: 9/4/2024  Visit #2  Time Calculation  Start Time: 1340  Stop Time: 1412  Time Calculation (min): 32 min    Insurance:  Visit Limit: 60  Date Range: No auth  Co-Pay: $30    Assessment:  Today's session focused on strength, ROM, neuromuscular control, joint mobility, soft tissue mobilization, and flexibility. Patient demonstrated good tolerance to session this date. They are demonstrating good progress in skilled rehabilitation at this time, though they are still limited by decreased muscle performance, decreased ROM, decreased activity tolerance, pain, participation restrictions, and difficulty with ADL completion. Patient continues to be a good candidate for skilled PT in order to further address listed impairments. Updated HEP to reflect today's session. All questions answered.    Patient's response to session: Decreased pain, Increased ROM/joint mobility, Increase motor control, and Increased knowledge and understanding    Plan:  Continue per POC.    Current Problem:   1. Concussion without loss of consciousness, subsequent encounter        2. Right hip pain            Subjective   Right hip feels better. Neck and headaches are still bothersome, though this comes and goes.    Pain Assessment: 0-10  0-10 (Numeric) Pain Score: 1    Precautions  Precautions Comment: No contact sports    Objective   Cervical AROM  Flexion: No loss  Extension: Mod loss  Rotation L/R: Min loss/Min loss  Side Bending L/R: Min loss/Min loss      Shoulder AROM (L/R)  Flexion: 170°/170°  Abduction: 170°/170°  Extension: 60°/60°  External Rotation: 90°/90°  Internal rotation: 70°/70°     Longus coli testing (Male normal 38.9 sec, Female 29.4 sec): 12 seconds     Joint mobility testing (normal unless otherwise noted below)  C0-1:  C1-3:  C3-6:   C7-T5: hypo     Number of Symptoms: NT     Hip ROM (L/R)  Flexion: 125°/125°  Abduction: 45°/45°  Extension:  "10°/10°  External Rotation: 45°/45°  Internal rotation: 45°/45°     Special Tests  Stork Sign: Negative  Frank Test: Negative  Straight Leg Raise: Negative  Slump Test: Negative  Sacral Thrust: Positive  FADDIR: Positive  Hamstring Length: >91     Gait: unremarkable    Treatments:  Therapeutic Exercise (25502): 29 minutes  HEP review  Clamshell isometrics, 10 sec*  Cervical towel snag*  1st rib MWM cervical rotation  CTJ side glide MWM cervical rotation  T1-3 ext nags    Manual Therapy (27001):  0 minutes  Not performed    Neuromuscular Re-education (55073):  0 minutes  Not performed    Dry Needling (72999): 1 minutes  Location (Needle length x Dosage):  - Suboccipitals 1\" x 2  - UT L 1\" x 1    Type: basic insertion with light pistoning (to patient's tolerance)  E-stim: None  Response: no adverse events observed or reported    Patient educated on theory and practical application of dry needling, as well as potential risks and benefits of needling. Additional verbal consent obtained prior to initiation of needling. Standard precautions, knowledge of appropriate benefits of treatment, and exclusion of relevant contraindications utilized.    Education and discussion on HEP and treatment regarding the benefits related to current condition, POC, pathophysiology, and precautions    *added to HEP  "

## 2024-09-13 ENCOUNTER — APPOINTMENT (OUTPATIENT)
Dept: ORTHOPEDIC SURGERY | Facility: CLINIC | Age: 59
End: 2024-09-13
Payer: COMMERCIAL

## 2024-09-13 DIAGNOSIS — M67.951 TENDINOPATHY OF RIGHT GLUTEUS MEDIUS: Primary | ICD-10-CM

## 2024-09-13 DIAGNOSIS — M25.551 RIGHT HIP PAIN: ICD-10-CM

## 2024-09-13 PROCEDURE — 99441 PR PHYS/QHP TELEPHONE EVALUATION 5-10 MIN: CPT | Performed by: FAMILY MEDICINE

## 2024-09-13 PROCEDURE — 1036F TOBACCO NON-USER: CPT | Performed by: FAMILY MEDICINE

## 2024-09-18 PROBLEM — M67.951 TENDINOPATHY OF RIGHT GLUTEUS MEDIUS: Status: ACTIVE | Noted: 2024-09-18

## 2024-09-18 NOTE — PROGRESS NOTES
F/U R Hip    R Hip    Subjective   Jennifer uJan is a 58 y.o. female who presents for No chief complaint on file.    7/4/2024 she was dancing. No acute injury or trauma. Locating glutes, greater trochanter. Worse with going up and down the stairs, sitting to standing especially in and out of car, lying on R side. Difficulty sleeping.  Better nothing  Massage, ice, heat, tylenol, advil as needed  No formal physical therapy  Denies numbness, tingling.  Never had surgery of R hip.    9/13/2024: Jennifer returns for follow-up from her hip injury that occurred on 7/4/2024.  She was seen a little over a month ago and had a steroid injection which has helped some to the glue medius tendon sheath.  She denies any new injury or trauma to the area.  She is looking for some advice on next steps.  She has been compliant with her restrictions and home exercise program.  He has also been to a few sessions of formal physical therapy.  She does have some back issues as well that may be contributing.    ROS: All pertinent positive symptoms are included in the history of present illness.    All other systems have been reviewed and are negative and noncontributory to this patient's current ailments.    Objective     There were no vitals filed for this visit.    No exam was done today as this was a virtual visit with audio capabilities only.    Imaging:  No additional imaging was obtained today.    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  I think we are on a good path at this point.  We will let the steroid injection continue to work.  If anything changes she will let us know.  She should continue with formal physical therapy and follow-up with her current physicians.  All of her questions were answered and she agrees with treatment plan.    I performed this visit using real-time telehealth  tools, including an audio connection between Jennifer Juan at her home and myself, Dr. Tito Castellon at the [facility] office.  I spent 6 minutes with them and greater than 50% of that time was spent discussing their diagnosis, prognosis, and treatment options.    ** Please excuse any errors in grammar or translation related to this dictation. Voice recognition software was utilized to prepare this document. **    Roberto Castellon M.D.  Clinical , Division of Sports Medicine  Primary Care Sports Medicine  Department of Orthopedic Surgery  [facility]

## 2024-09-20 ENCOUNTER — TREATMENT (OUTPATIENT)
Dept: PHYSICAL THERAPY | Facility: CLINIC | Age: 59
End: 2024-09-20
Payer: COMMERCIAL

## 2024-09-20 DIAGNOSIS — S06.0X0A CONCUSSION WITHOUT LOSS OF CONSCIOUSNESS, INITIAL ENCOUNTER: ICD-10-CM

## 2024-09-20 DIAGNOSIS — M25.551 RIGHT HIP PAIN: ICD-10-CM

## 2024-09-20 PROCEDURE — 97110 THERAPEUTIC EXERCISES: CPT | Mod: GP | Performed by: PHYSICAL THERAPIST

## 2024-09-20 ASSESSMENT — PAIN - FUNCTIONAL ASSESSMENT: PAIN_FUNCTIONAL_ASSESSMENT: 0-10

## 2024-09-20 ASSESSMENT — PAIN SCALES - GENERAL: PAINLEVEL_OUTOF10: 1

## 2024-09-20 NOTE — PROGRESS NOTES
Physical Therapy Treatment      Patient Name: Jennifer Juan  MRN: [id number]  Today's Date: 9/20/2024  Visit #3  Time Calculation  Start Time: 0900  Stop Time: 0930  Time Calculation (min): 30 min    Insurance:  Visit Limit: 60  Date Range: No auth  Co-Pay: $30    Assessment:  Discussed HEP in depth today. Today's session focused on strength, ROM, neuromuscular control, joint mobility, soft tissue mobilization, and flexibility. Patient demonstrated good tolerance to session this date. They are demonstrating good progress in skilled rehabilitation at this time, though they are still limited by decreased muscle performance, decreased ROM, decreased activity tolerance, pain, participation restrictions, and difficulty with ADL completion. Patient continues to be a good candidate for skilled PT in order to further address listed impairments. Updated HEP to reflect today's session. All questions answered.    Patient's response to session: Decreased pain, Increased ROM/joint mobility, Increase motor control, and Increased knowledge and understanding    Plan:  Continue per POC.    Current Problem:   1. Concussion without loss of consciousness, initial encounter  Follow Up In Physical Therapy      2. Right hip pain  Follow Up In Physical Therapy          Subjective   Patient reports her right hip has been more painful lately. Neck is still stiff. She has not been doing HEP much.    Pain Assessment: 0-10  0-10 (Numeric) Pain Score: 1    Precautions  Precautions Comment: None    Objective   Cervical AROM  Flexion: No loss  Extension: Mod loss  Rotation L/R: Min loss/Min loss  Side Bending L/R: Min loss/Min loss      Shoulder AROM (L/R)  Flexion: 170°/170°  Abduction: 170°/170°  Extension: 60°/60°  External Rotation: 90°/90°  Internal rotation: 70°/70°     Longus coli testing (Male normal 38.9 sec, Female 29.4 sec): 12 seconds     Joint mobility testing (normal unless otherwise noted below)  C0-1:  C1-3:  C3-6:   C7-T5: hypo      Number of Symptoms: NT     Hip ROM (L/R)  Flexion: 125°/125°  Abduction: 45°/45°  Extension: 10°/10°  External Rotation: 45°/45°  Internal rotation: 45°/45°     Special Tests  Stork Sign: Negative  Frank Test: Negative  Straight Leg Raise: Negative  Slump Test: Negative  Sacral Thrust: Positive  FADDIR: Positive  Hamstring Length: >91     Gait: unremarkable    Treatments:  Therapeutic Exercise (49671): 27 minutes  Extensive HEP review  Clamshell isometrics, 5 sec*  Cervical towel snag*  Wall slides*  Supine chin tuck*    Manual Therapy (55229):  0 minutes  Not performed    Neuromuscular Re-education (94388):  0 minutes  Not performed    Education and discussion on HEP and treatment regarding the benefits related to current condition, POC, pathophysiology, and precautions    *added to HEP

## 2024-09-27 ENCOUNTER — APPOINTMENT (OUTPATIENT)
Dept: NEUROLOGY | Facility: CLINIC | Age: 59
End: 2024-09-27
Payer: COMMERCIAL

## 2024-09-27 VITALS
DIASTOLIC BLOOD PRESSURE: 81 MMHG | BODY MASS INDEX: 27 KG/M2 | HEART RATE: 68 BPM | HEIGHT: 67 IN | SYSTOLIC BLOOD PRESSURE: 117 MMHG | WEIGHT: 172 LBS

## 2024-09-27 DIAGNOSIS — S06.0X0D CONCUSSION WITHOUT LOSS OF CONSCIOUSNESS, SUBSEQUENT ENCOUNTER: Primary | ICD-10-CM

## 2024-09-27 PROCEDURE — 99214 OFFICE O/P EST MOD 30 MIN: CPT | Performed by: NURSE PRACTITIONER

## 2024-09-27 PROCEDURE — 3008F BODY MASS INDEX DOCD: CPT | Performed by: NURSE PRACTITIONER

## 2024-09-27 PROCEDURE — 1036F TOBACCO NON-USER: CPT | Performed by: NURSE PRACTITIONER

## 2024-09-27 RX ORDER — PREDNISONE 10 MG/1
TABLET ORAL
Qty: 42 TABLET | Refills: 0 | Status: SHIPPED | OUTPATIENT
Start: 2024-09-27 | End: 2024-10-08

## 2024-09-27 ASSESSMENT — PATIENT HEALTH QUESTIONNAIRE - PHQ9
1. LITTLE INTEREST OR PLEASURE IN DOING THINGS: NOT AT ALL
1. LITTLE INTEREST OR PLEASURE IN DOING THINGS: NOT AT ALL
2. FEELING DOWN, DEPRESSED OR HOPELESS: NOT AT ALL
2. FEELING DOWN, DEPRESSED OR HOPELESS: NOT AT ALL
SUM OF ALL RESPONSES TO PHQ9 QUESTIONS 1 & 2: 0
SUM OF ALL RESPONSES TO PHQ9 QUESTIONS 1 AND 2: 0

## 2024-09-27 ASSESSMENT — ENCOUNTER SYMPTOMS
OCCASIONAL FEELINGS OF UNSTEADINESS: 0
DEPRESSION: 0
LOSS OF SENSATION IN FEET: 0

## 2024-09-27 NOTE — PROGRESS NOTES
Patient being assessed today for new complaint of concussion that she obtained from hitting her head on a pole several months ago.  She has been following with her PCP and has been going to physical therapy.  She still is having some tenderness and will get breakthrough headache activity and the location of where the trauma occurred.  Will give her high tapering dose of steroid to help reduce that and also to help reduce the tension of the neck that she has been experiencing since then as well.  Continue her other medications as she is previously taking them.  Discussed role of medicine, importance of taking medications, potential risks, benefits, and precautions to be taken.  Reviewed sleep hygiene and dietary modifications.  Follow-up in November.    This note was created with voice recognition software and was not corrected for typographical or grammatical errors

## 2024-10-02 ENCOUNTER — APPOINTMENT (OUTPATIENT)
Dept: PHYSICAL THERAPY | Facility: CLINIC | Age: 59
End: 2024-10-02
Payer: COMMERCIAL

## 2024-10-10 ENCOUNTER — APPOINTMENT (OUTPATIENT)
Dept: PHYSICAL THERAPY | Facility: CLINIC | Age: 59
End: 2024-10-10
Payer: COMMERCIAL

## 2024-10-10 DIAGNOSIS — M25.551 RIGHT HIP PAIN: ICD-10-CM

## 2024-10-10 DIAGNOSIS — S06.0X0A CONCUSSION WITHOUT LOSS OF CONSCIOUSNESS, INITIAL ENCOUNTER: ICD-10-CM

## 2024-10-10 PROCEDURE — 97110 THERAPEUTIC EXERCISES: CPT | Mod: GP | Performed by: PHYSICAL THERAPIST

## 2024-10-10 ASSESSMENT — PAIN - FUNCTIONAL ASSESSMENT: PAIN_FUNCTIONAL_ASSESSMENT: 0-10

## 2024-10-10 ASSESSMENT — PAIN SCALES - GENERAL: PAINLEVEL_OUTOF10: 1

## 2024-10-10 NOTE — PROGRESS NOTES
Physical Therapy Treatment      Patient Name: Jennifer Juan  MRN: [id number]  Today's Date: 10/10/2024  Visit #4  Time Calculation  Start Time: 1520  Stop Time: 1555  Time Calculation (min): 35 min    Insurance:  Visit Limit: 60  Date Range: No auth  Co-Pay: $30    Assessment:  Patient complaining of neck stiffness and a little bit of pain on the L side.  Today's session focused on strength, ROM, neuromuscular control, joint mobility, soft tissue mobilization, and flexibility. Patient demonstrated good tolerance to session this date. They are demonstrating good progress in skilled rehabilitation at this time, though they are still limited by decreased muscle performance, decreased ROM, decreased activity tolerance, pain, participation restrictions, and difficulty with ADL completion. Patient continues to be a good candidate for skilled PT in order to further address listed impairments. Updated HEP to reflect today's session. All questions answered.    Patient's response to session: Decreased pain, Increased ROM/joint mobility, Increase motor control, and Increased knowledge and understanding    Plan:  Continue per POC.    Current Problem:   1. Concussion without loss of consciousness, initial encounter  Follow Up In Physical Therapy      2. Right hip pain  Follow Up In Physical Therapy          Subjective   Patient reports neck is still stiff and a bit painful. She is still having difficulty looking to the left while driving. She is not currently having any issues with the hip. Patient reports HEP compliance.     Pain Assessment: 0-10  0-10 (Numeric) Pain Score: 1    Precautions  Precautions Comment: None    Objective   Cervical AROM  Flexion: No loss  Extension: Mod loss  Rotation L/R: Min loss and tight/Min loss  Side Bending L/R: Min loss/Min loss and tight     Shoulder AROM (L/R)  Flexion: 170°/170°  Abduction: 170°/170°  Extension: 60°/60°  External Rotation: 90°/90°  Internal rotation: 70°/70°     Longus coli  "testing (Male normal 38.9 sec, Female 29.4 sec): 12 seconds     Joint mobility testing (normal unless otherwise noted below)  C0-1:  C1-3:  C3-6:   C7-T5: hypo     Number of Symptoms: NT     Hip ROM (L/R)  Flexion: 125°/125°  Abduction: 45°/45°  Extension: 10°/10°  External Rotation: 45°/45°  Internal rotation: 45°/45°     Special Tests  Stork Sign: Negative  Frank Test: Negative  Straight Leg Raise: Negative  Slump Test: Negative  Sacral Thrust: Positive  FADDIR: Positive  Hamstring Length: >91     Gait: unremarkable    Treatments:  Therapeutic Exercise (67419): 25 minutes  Extensive HEP review  Cervical towel snag*  Levator stretch to R    Manual Therapy (80867):  0 minutes  Not performed    Neuromuscular Re-education (27954):  0 minutes  Not performed    Dry Needling (59631): 4 minutes  Location (Needle length x Dosage):  - Suboccipital on L, 1\" x 1  - C2 on L, 1\" x 1  - C5 on L, 1\" x 1  - L UT, 1\" x 1    Type: basic insertion with light pistoning (to patient's tolerance)  E-stim: None  Response: no adverse events observed or reported    Patient educated on theory and practical application of dry needling, as well as potential risks and benefits of needling. Additional verbal consent obtained prior to initiation of needling. Standard precautions, knowledge of appropriate benefits of treatment, and exclusion of relevant contraindications utilized.    Education and discussion on HEP and treatment regarding the benefits related to current condition, POC, pathophysiology, and precautions    *added to HEP  "

## 2024-10-15 ENCOUNTER — APPOINTMENT (OUTPATIENT)
Dept: PHYSICAL THERAPY | Facility: CLINIC | Age: 59
End: 2024-10-15
Payer: COMMERCIAL

## 2024-10-15 ENCOUNTER — HOSPITAL ENCOUNTER (OUTPATIENT)
Dept: RADIOLOGY | Facility: CLINIC | Age: 59
Discharge: HOME | End: 2024-10-15
Payer: COMMERCIAL

## 2024-10-15 ENCOUNTER — OFFICE VISIT (OUTPATIENT)
Dept: ORTHOPEDIC SURGERY | Facility: CLINIC | Age: 59
End: 2024-10-15
Payer: COMMERCIAL

## 2024-10-15 DIAGNOSIS — S29.012A STRAIN OF LEFT RHOMBOID MUSCLE: Primary | ICD-10-CM

## 2024-10-15 DIAGNOSIS — M25.551 RIGHT HIP PAIN: ICD-10-CM

## 2024-10-15 DIAGNOSIS — M54.9 UPPER BACK PAIN: ICD-10-CM

## 2024-10-15 DIAGNOSIS — S06.0X0A CONCUSSION WITHOUT LOSS OF CONSCIOUSNESS, INITIAL ENCOUNTER: ICD-10-CM

## 2024-10-15 PROCEDURE — 97110 THERAPEUTIC EXERCISES: CPT | Mod: GP | Performed by: PHYSICAL THERAPIST

## 2024-10-15 PROCEDURE — 72070 X-RAY EXAM THORAC SPINE 2VWS: CPT

## 2024-10-15 PROCEDURE — 1036F TOBACCO NON-USER: CPT | Performed by: FAMILY MEDICINE

## 2024-10-15 PROCEDURE — 99214 OFFICE O/P EST MOD 30 MIN: CPT | Performed by: FAMILY MEDICINE

## 2024-10-15 ASSESSMENT — PAIN SCALES - GENERAL
PAINLEVEL_OUTOF10: 4
PAINLEVEL_OUTOF10: 6

## 2024-10-15 ASSESSMENT — PAIN - FUNCTIONAL ASSESSMENT
PAIN_FUNCTIONAL_ASSESSMENT: 0-10
PAIN_FUNCTIONAL_ASSESSMENT: 0-10

## 2024-10-15 ASSESSMENT — PAIN DESCRIPTION - DESCRIPTORS: DESCRIPTORS: SHARP;DULL

## 2024-10-15 NOTE — PROGRESS NOTES
Est NP upper back pain    Today's Date: 10/15/2024       Subjective:     HPI: Jennifer Juan is a 59 y.o. female who presents today for upper back pain.  Patient has been working with physical therapy for her neck pain.  On 10/9/2024, she had a heating pad over her neck area while she was sleeping and noted that it must have slipped down to her thoracic region resulting in thoracic back pain since.  Worse with reaching away from body and rotating/twisting her back.  She has tried heat, ice, Tylenol 1000mg, Advil 200-400mg.      Review of Systems: See pertinent ROS in HPI above     Patient's Allergies, Current Medications, Past Medical History, Past Surgical History, Family History, Social History reviewed.     Objective:  There is no height or weight on file to calculate BMI.    Physical Examination:     General: Well-appearing  Back exam: Pain with rotation of her back.  No pain with forward bending, extension and lateral bending. Tenderness to palpation over left rhomboid/latissimus dorsi muscle with spasm.  No pain over cervical, thoracic, lumbar spine.    Imaging:  Radiographs of thoracic spine obtained on 10/15/2024 by Dr. Roberto Castellon were reviewed and revealed no acute fracture or dislocation.  Appropriate space between vertebrae.    Assessment and Plan:     1. Upper back pain  XR thoracic spine 2 views        Jennifer Juan is a 59-year-old female who presents with upper back pain.  This is most likely left rhomboid muscle strain.  Discussed diagnosis and treatment.  With shared decision making,  - Heat as needed.  Tennis ball against the wall to help with muscle spasm.  Recommend discontinuing ice.  - Voltaren gel 2-3 times a day as needed.  - Physical therapy for upper back pain using modalities for spasm    Follow-up as needed.    Kenny Malagon MD  Primary Care Sports Medicine Fellow  [facility]  [place]

## 2024-10-15 NOTE — PROGRESS NOTES
Physical Therapy Treatment      Patient Name: Jennifer Juan  MRN: [id number]  Today's Date: 10/15/2024  Visit #5  Time Calculation  Start Time: 1445  Stop Time: 1520  Time Calculation (min): 35 min    Insurance:  Visit Limit: 60  Date Range: No auth  Co-Pay: $30    Assessment:  Patient continuing to have neck stiffness and presented with thoracic stiffness and L sided pain as well. Today's session focused on strength, ROM, neuromuscular control, joint mobility, soft tissue mobilization, and flexibility. Patient demonstrated good tolerance to session this date. They are demonstrating good progress in skilled rehabilitation at this time, though they are still limited by decreased muscle performance, decreased ROM, decreased activity tolerance, pain, participation restrictions, and difficulty with ADL completion. Patient continues to be a good candidate for skilled PT in order to further address listed impairments. Updated HEP to reflect today's session. All questions answered.    Patient's response to session: Decreased pain, Increased ROM/joint mobility, Increase motor control, and Increased knowledge and understanding    Plan:  Continue per POC.    Current Problem:   1. Concussion without loss of consciousness, initial encounter  Follow Up In Physical Therapy      2. Right hip pain  Follow Up In Physical Therapy          Subjective   Patient reports neck continues to be stiff and she is now having pain in her L thoracic region. Pain with rotational movements.      Pain Assessment: 0-10  0-10 (Numeric) Pain Score: 4    Precautions  Precautions Comment: None    Objective   Cervical AROM  Flexion: No loss  Extension: Mod loss  Rotation L/R: Min loss and tight/Min loss  Side Bending L/R: Min loss/Min loss and tight    Thoracic AROM  Flexion: No loss  Extension: No loss  Rotation L/R: Mod loss and painful/Mod loss and painful    Shoulder AROM (L/R)  Flexion: 170°/170°  Abduction: 170°/170°  Extension: 60°/60°  External  Rotation: 90°/90°  Internal rotation: 70°/70°     Joint mobility testing (normal unless otherwise noted below)  C0-1:  C1-3:  C3-6:   C7-T5: hypo     Hip ROM (L/R)  Flexion: 125°/125°  Abduction: 45°/45°  Extension: 10°/10°  External Rotation: 45°/45°  Internal rotation: 45°/45°     Special Tests  Stork Sign: Negative  Frank Test: Negative  Straight Leg Raise: Negative  Slump Test: Negative  Sacral Thrust: Positive  FADDIR: Positive  Hamstring Length: >91     Gait: unremarkable    Treatments:  Therapeutic Exercise (75079): 25 minutes  Extensive HEP review  Open the book  Thoracic extension over chair  Rows, green TB*  Pallof press, green TB*    Manual Therapy (28040):  0 minutes  Not performed    Neuromuscular Re-education (27451):  0 minutes  Not performed    Education and discussion on HEP and treatment regarding the benefits related to current condition, POC, pathophysiology, and precautions    *added to HEP

## 2024-10-16 ENCOUNTER — APPOINTMENT (OUTPATIENT)
Dept: ORTHOPEDIC SURGERY | Facility: CLINIC | Age: 59
End: 2024-10-16
Payer: COMMERCIAL

## 2024-10-24 ENCOUNTER — APPOINTMENT (OUTPATIENT)
Dept: PHYSICAL THERAPY | Facility: CLINIC | Age: 59
End: 2024-10-24
Payer: COMMERCIAL

## 2024-10-31 ENCOUNTER — APPOINTMENT (OUTPATIENT)
Dept: PHYSICAL THERAPY | Facility: CLINIC | Age: 59
End: 2024-10-31
Payer: COMMERCIAL

## 2024-11-08 ENCOUNTER — APPOINTMENT (OUTPATIENT)
Dept: NEUROLOGY | Facility: CLINIC | Age: 59
End: 2024-11-08
Payer: COMMERCIAL

## 2024-11-12 ENCOUNTER — APPOINTMENT (OUTPATIENT)
Dept: PHYSICAL THERAPY | Facility: CLINIC | Age: 59
End: 2024-11-12
Payer: COMMERCIAL

## 2024-11-12 DIAGNOSIS — M25.551 RIGHT HIP PAIN: ICD-10-CM

## 2024-11-12 DIAGNOSIS — S06.0X0A CONCUSSION WITHOUT LOSS OF CONSCIOUSNESS, INITIAL ENCOUNTER: ICD-10-CM

## 2024-11-14 ENCOUNTER — OFFICE VISIT (OUTPATIENT)
Dept: NEUROLOGY | Facility: CLINIC | Age: 59
End: 2024-11-14
Payer: COMMERCIAL

## 2024-11-14 VITALS
HEART RATE: 72 BPM | WEIGHT: 176.3 LBS | DIASTOLIC BLOOD PRESSURE: 72 MMHG | SYSTOLIC BLOOD PRESSURE: 118 MMHG | BODY MASS INDEX: 27.67 KG/M2 | HEIGHT: 67 IN

## 2024-11-14 DIAGNOSIS — M47.812 CERVICAL SPONDYLOSIS WITHOUT MYELOPATHY: Primary | ICD-10-CM

## 2024-11-14 DIAGNOSIS — G62.9 NEUROPATHY: ICD-10-CM

## 2024-11-14 DIAGNOSIS — R93.7 ABNORMAL X-RAY OF CERVICAL SPINE: ICD-10-CM

## 2024-11-14 DIAGNOSIS — M48.061 SPINAL STENOSIS OF LUMBAR REGION WITHOUT NEUROGENIC CLAUDICATION: ICD-10-CM

## 2024-11-14 DIAGNOSIS — M54.2 NECK PAIN: ICD-10-CM

## 2024-11-14 PROCEDURE — 3008F BODY MASS INDEX DOCD: CPT | Performed by: NURSE PRACTITIONER

## 2024-11-14 PROCEDURE — 1036F TOBACCO NON-USER: CPT | Performed by: NURSE PRACTITIONER

## 2024-11-14 PROCEDURE — 99214 OFFICE O/P EST MOD 30 MIN: CPT | Performed by: NURSE PRACTITIONER

## 2024-11-14 RX ORDER — TRIAMCINOLONE ACETONIDE 1 MG/G
PASTE DENTAL
COMMUNITY
Start: 2024-05-08

## 2024-11-14 RX ORDER — PREGABALIN 100 MG/1
1 CAPSULE ORAL 3 TIMES DAILY
COMMUNITY
Start: 2024-08-16 | End: 2024-11-14

## 2024-11-14 RX ORDER — GABAPENTIN 600 MG/1
1200 TABLET ORAL 3 TIMES DAILY
Qty: 540 TABLET | Refills: 1 | Status: SHIPPED | OUTPATIENT
Start: 2024-11-14

## 2024-11-14 RX ORDER — ESOMEPRAZOLE MAGNESIUM 40 MG/1
1 CAPSULE, DELAYED RELEASE ORAL
COMMUNITY
Start: 2024-08-12

## 2024-11-14 RX ORDER — NEOMYCIN SULFATE, POLYMYXIN B SULFATE, AND DEXAMETHASONE 3.5; 10000; 1 MG/G; [USP'U]/G; MG/G
OINTMENT OPHTHALMIC
COMMUNITY
Start: 2024-05-23

## 2024-11-14 RX ORDER — GABAPENTIN 900 MG/1
1800 TABLET, FILM COATED ORAL
Qty: 60 TABLET | Refills: 3 | Status: SHIPPED | OUTPATIENT
Start: 2024-11-14 | End: 2024-11-14

## 2024-11-14 RX ORDER — LUMATEPERONE 21 MG/1
1 CAPSULE ORAL
COMMUNITY
Start: 2024-10-24

## 2024-11-14 ASSESSMENT — PATIENT HEALTH QUESTIONNAIRE - PHQ9
6. FEELING BAD ABOUT YOURSELF - OR THAT YOU ARE A FAILURE OR HAVE LET YOURSELF OR YOUR FAMILY DOWN: MORE THAN HALF THE DAYS
SUM OF ALL RESPONSES TO PHQ9 QUESTIONS 1 AND 2: 3
4. FEELING TIRED OR HAVING LITTLE ENERGY: SEVERAL DAYS
SUM OF ALL RESPONSES TO PHQ QUESTIONS 1-9: 14
5. POOR APPETITE OR OVEREATING: MORE THAN HALF THE DAYS
10. IF YOU CHECKED OFF ANY PROBLEMS, HOW DIFFICULT HAVE THESE PROBLEMS MADE IT FOR YOU TO DO YOUR WORK, TAKE CARE OF THINGS AT HOME, OR GET ALONG WITH OTHER PEOPLE: SOMEWHAT DIFFICULT
1. LITTLE INTEREST OR PLEASURE IN DOING THINGS: SEVERAL DAYS
7. TROUBLE CONCENTRATING ON THINGS, SUCH AS READING THE NEWSPAPER OR WATCHING TELEVISION: NEARLY EVERY DAY
8. MOVING OR SPEAKING SO SLOWLY THAT OTHER PEOPLE COULD HAVE NOTICED. OR THE OPPOSITE, BEING SO FIGETY OR RESTLESS THAT YOU HAVE BEEN MOVING AROUND A LOT MORE THAN USUAL: SEVERAL DAYS
9. THOUGHTS THAT YOU WOULD BE BETTER OFF DEAD, OR OF HURTING YOURSELF: NOT AT ALL
3. TROUBLE FALLING OR STAYING ASLEEP OR SLEEPING TOO MUCH: MORE THAN HALF THE DAYS
2. FEELING DOWN, DEPRESSED OR HOPELESS: MORE THAN HALF THE DAYS

## 2024-11-14 ASSESSMENT — ENCOUNTER SYMPTOMS
LOSS OF SENSATION IN FEET: 0
DEPRESSION: 1
OCCASIONAL FEELINGS OF UNSTEADINESS: 0

## 2024-11-14 ASSESSMENT — PAIN SCALES - GENERAL: PAINLEVEL_OUTOF10: 6

## 2024-11-14 NOTE — PROGRESS NOTES
Patient being assessed today for follow-up of concussion and headaches.  She followed with her PCP who had an x-ray of her cervical spine completed.  It does show varying levels of changes.  Would like to get an MRI of the cervical spine with and without contrast for further evaluation as this could be the root cause to the a lot of the pain that she is having.  Continue the gabapentin as she has been taking it.  [place] report reviewed.  Discussed role of medicine, controlled substance policy, abuse potential, importance of taking medications, potential risks, benefits, and precautions to be taken.  Reviewed sleep hygiene and dietary modifications.  Follow-up in 6 months or sooner if needed.    This note was created with voice recognition software and was not corrected for typographical or grammatical errors

## 2024-11-15 RX ORDER — GABAPENTIN 600 MG/1
1200 TABLET ORAL 3 TIMES DAILY
Qty: 540 TABLET | Refills: 0 | OUTPATIENT
Start: 2024-11-15

## 2024-11-22 ENCOUNTER — TREATMENT (OUTPATIENT)
Dept: PHYSICAL THERAPY | Facility: CLINIC | Age: 59
End: 2024-11-22
Payer: COMMERCIAL

## 2024-11-22 DIAGNOSIS — S06.0X0A CONCUSSION WITHOUT LOSS OF CONSCIOUSNESS, INITIAL ENCOUNTER: ICD-10-CM

## 2024-11-22 DIAGNOSIS — M25.551 RIGHT HIP PAIN: ICD-10-CM

## 2024-11-22 PROCEDURE — 97110 THERAPEUTIC EXERCISES: CPT | Mod: GP | Performed by: PHYSICAL THERAPIST

## 2024-11-22 ASSESSMENT — PAIN SCALES - GENERAL: PAINLEVEL_OUTOF10: 3

## 2024-11-22 ASSESSMENT — PAIN - FUNCTIONAL ASSESSMENT: PAIN_FUNCTIONAL_ASSESSMENT: 0-10

## 2024-11-22 NOTE — PROGRESS NOTES
Physical Therapy Treatment      Patient Name: Jennifer Juan  MRN: [id number]  Today's Date: 11/22/2024  Visit #6  Time Calculation  Start Time: 1106  Stop Time: 1146  Time Calculation (min): 40 min    Insurance:  Visit Limit: 60  Date Range: No auth  Co-Pay: $30    Assessment:  Patient continuing to work on gentle movement of cervical spine. No adverse reactions with session. Today's session focused on strength, ROM, neuromuscular control, joint mobility, soft tissue mobilization, and flexibility. Patient demonstrated good tolerance to session this date. They are demonstrating good progress in skilled rehabilitation at this time, though they are still limited by decreased muscle performance, decreased ROM, decreased activity tolerance, pain, participation restrictions, and difficulty with ADL completion. Patient continues to be a good candidate for skilled PT in order to further address listed impairments. Updated HEP to reflect today's session. All questions answered.    Patient's response to session: Decreased pain, Increased ROM/joint mobility, Increase motor control, and Increased knowledge and understanding    Plan:  Continue per POC.    Current Problem:   1. Concussion without loss of consciousness, initial encounter        2. Right hip pain            Subjective   Patient reports she continues to have decreased ROM and pain with L rotational movements. She has had more pain go into the shoulder region. And an increase in headaches.     Pain Assessment: 0-10  0-10 (Numeric) Pain Score: 3    Precautions  Precautions Comment: None    Objective   Cervical AROM  Flexion: No loss  Extension: Mod loss  Rotation L/R: Min loss and tight/Min loss  Side Bending L/R: Min loss/Min loss and tight    Thoracic AROM  Flexion: No loss  Extension: No loss  Rotation L/R: Mod loss and painful/Mod loss and painful    Shoulder AROM (L/R)  Flexion: 170°/170°  Abduction: 170°/170°  Extension: 60°/60°  External Rotation:  "90°/90°  Internal rotation: 70°/70°     Joint mobility testing (normal unless otherwise noted below)  C0-1:  C1-3:  C3-6:   C7-T5: hypo     Hip ROM (L/R)  Flexion: 125°/125°  Abduction: 45°/45°  Extension: 10°/10°  External Rotation: 45°/45°  Internal rotation: 45°/45°     Special Tests  Stork Sign: Negative  Frank Test: Negative  Straight Leg Raise: Negative  Slump Test: Negative  Sacral Thrust: Positive  FADDIR: Positive  Hamstring Length: >91     Gait: unremarkable    Treatments:  Therapeutic Exercise (69458): 38 minutes  Extensive HEP review  Horizontal abduction, red TB*  Supine cervical retraction*  Median nerve glide*    Manual Therapy (13118):  0 minutes  Not performed    Neuromuscular Re-education (48203):  0 minutes  Not performed    Dry Needling (73015): 2 minutes - performed by Vahe Simmons PT  Location (Needle length x Dosage):  - Suboccipital, 1\" x 1  - C2, 1\" x 1  - C5, 1\" x 1  - UT, 1\" x 1    Type: basic insertion with light pistoning (to patient's tolerance)  E-stim: Yes  Response: no adverse events observed or reported    Patient educated on theory and practical application of dry needling, as well as potential risks and benefits of needling. Additional verbal consent obtained prior to initiation of needling. Standard precautions, knowledge of appropriate benefits of treatment, and exclusion of relevant contraindications utilized.    Education and discussion on HEP and treatment regarding the benefits related to current condition, POC, pathophysiology, and precautions    *added to HEP    Care provided under direct supervision of Vahe Simmons PT, DPT, OCS, CSCS  "

## 2024-11-26 ENCOUNTER — TELEPHONE (OUTPATIENT)
Dept: NEUROLOGY | Facility: CLINIC | Age: 59
End: 2024-11-26
Payer: COMMERCIAL

## 2024-11-26 NOTE — TELEPHONE ENCOUNTER
[facility] called requesting Jennifer's MRI w & WO contrast be faxed over to [phone]. [facility] office was not covered by her insurance so she is going else where.     If you have any questions call Senait back at [phone]

## 2024-12-03 ENCOUNTER — APPOINTMENT (OUTPATIENT)
Dept: RADIOLOGY | Facility: CLINIC | Age: 59
End: 2024-12-03
Payer: COMMERCIAL

## 2024-12-05 ENCOUNTER — APPOINTMENT (OUTPATIENT)
Dept: PHYSICAL THERAPY | Facility: CLINIC | Age: 59
End: 2024-12-05
Payer: COMMERCIAL

## 2024-12-05 DIAGNOSIS — S06.0X0A CONCUSSION WITHOUT LOSS OF CONSCIOUSNESS, INITIAL ENCOUNTER: ICD-10-CM

## 2024-12-05 DIAGNOSIS — M25.551 RIGHT HIP PAIN: ICD-10-CM

## 2024-12-12 ENCOUNTER — LAB (OUTPATIENT)
Dept: LAB | Facility: LAB | Age: 59
End: 2024-12-12
Payer: COMMERCIAL

## 2024-12-12 DIAGNOSIS — M47.812 CERVICAL SPONDYLOSIS WITHOUT MYELOPATHY: ICD-10-CM

## 2024-12-12 LAB
ALBUMIN SERPL BCP-MCNC: 4.2 G/DL (ref 3.4–5)
ALP SERPL-CCNC: 54 U/L (ref 33–136)
ALT SERPL W P-5'-P-CCNC: 12 U/L (ref 7–45)
ANION GAP SERPL CALC-SCNC: 12 MMOL/L (ref 10–20)
AST SERPL W P-5'-P-CCNC: 12 U/L (ref 9–39)
BILIRUB SERPL-MCNC: 0.4 MG/DL (ref 0–1.2)
BUN SERPL-MCNC: 17 MG/DL (ref 6–23)
CALCIUM SERPL-MCNC: 9.2 MG/DL (ref 8.6–10.6)
CHLORIDE SERPL-SCNC: 107 MMOL/L (ref 98–107)
CO2 SERPL-SCNC: 29 MMOL/L (ref 21–32)
CREAT SERPL-MCNC: 0.82 MG/DL (ref 0.5–1.05)
EGFRCR SERPLBLD CKD-EPI 2021: 81 ML/MIN/1.73M*2
GLUCOSE SERPL-MCNC: 73 MG/DL (ref 74–99)
POTASSIUM SERPL-SCNC: 4.8 MMOL/L (ref 3.5–5.3)
PROT SERPL-MCNC: 6.3 G/DL (ref 6.4–8.2)
SODIUM SERPL-SCNC: 143 MMOL/L (ref 136–145)

## 2024-12-12 PROCEDURE — 80053 COMPREHEN METABOLIC PANEL: CPT

## 2024-12-12 PROCEDURE — 36415 COLL VENOUS BLD VENIPUNCTURE: CPT

## 2025-01-03 ENCOUNTER — APPOINTMENT (OUTPATIENT)
Dept: PHYSICAL THERAPY | Facility: CLINIC | Age: 60
End: 2025-01-03
Payer: COMMERCIAL

## 2025-01-03 DIAGNOSIS — S06.0X0A CONCUSSION WITHOUT LOSS OF CONSCIOUSNESS, INITIAL ENCOUNTER: ICD-10-CM

## 2025-01-03 DIAGNOSIS — M25.551 RIGHT HIP PAIN: ICD-10-CM

## 2025-01-09 ENCOUNTER — APPOINTMENT (OUTPATIENT)
Dept: PHYSICAL THERAPY | Facility: CLINIC | Age: 60
End: 2025-01-09
Payer: COMMERCIAL

## 2025-01-09 DIAGNOSIS — S06.0X0A CONCUSSION WITHOUT LOSS OF CONSCIOUSNESS, INITIAL ENCOUNTER: ICD-10-CM

## 2025-01-09 DIAGNOSIS — M25.551 RIGHT HIP PAIN: ICD-10-CM

## 2025-03-18 DIAGNOSIS — M79.18 MYOFASCIAL PAIN: Primary | ICD-10-CM

## 2025-03-18 RX ORDER — LIDOCAINE 50 MG/G
OINTMENT TOPICAL AS NEEDED
Qty: 35.44 G | Refills: 1 | Status: SHIPPED | OUTPATIENT
Start: 2025-03-18

## 2025-03-20 ENCOUNTER — OFFICE VISIT (OUTPATIENT)
Dept: PRIMARY CARE | Facility: CLINIC | Age: 60
End: 2025-03-20
Payer: COMMERCIAL

## 2025-03-20 VITALS
HEART RATE: 84 BPM | OXYGEN SATURATION: 98 % | RESPIRATION RATE: 19 BRPM | WEIGHT: 177.4 LBS | BODY MASS INDEX: 27.84 KG/M2 | TEMPERATURE: 97.2 F | HEIGHT: 67 IN | DIASTOLIC BLOOD PRESSURE: 64 MMHG | SYSTOLIC BLOOD PRESSURE: 112 MMHG

## 2025-03-20 DIAGNOSIS — M54.42 CHRONIC LEFT-SIDED LOW BACK PAIN WITH LEFT-SIDED SCIATICA: ICD-10-CM

## 2025-03-20 DIAGNOSIS — M43.12 SPONDYLOLISTHESIS, CERVICAL REGION: Primary | ICD-10-CM

## 2025-03-20 DIAGNOSIS — G89.29 CHRONIC LEFT-SIDED LOW BACK PAIN WITH LEFT-SIDED SCIATICA: ICD-10-CM

## 2025-03-20 DIAGNOSIS — M47.816 LUMBAR SPONDYLOSIS: ICD-10-CM

## 2025-03-20 PROCEDURE — 1036F TOBACCO NON-USER: CPT | Performed by: NURSE PRACTITIONER

## 2025-03-20 PROCEDURE — 3008F BODY MASS INDEX DOCD: CPT | Performed by: NURSE PRACTITIONER

## 2025-03-20 PROCEDURE — 99214 OFFICE O/P EST MOD 30 MIN: CPT | Performed by: NURSE PRACTITIONER

## 2025-03-20 RX ORDER — TIZANIDINE HYDROCHLORIDE 2 MG/1
2 CAPSULE, GELATIN COATED ORAL NIGHTLY
Qty: 30 CAPSULE | Refills: 0 | Status: SHIPPED | OUTPATIENT
Start: 2025-03-20 | End: 2025-04-19

## 2025-03-20 RX ORDER — MELOXICAM 15 MG/1
15 TABLET ORAL DAILY
Qty: 30 TABLET | Refills: 0 | Status: SHIPPED | OUTPATIENT
Start: 2025-03-20 | End: 2025-04-19

## 2025-03-20 ASSESSMENT — ENCOUNTER SYMPTOMS
NECK STIFFNESS: 0
BACK PAIN: 1
ARTHRALGIAS: 1

## 2025-03-20 NOTE — PROGRESS NOTES
"Subjective   Patient ID: Jennifer Juan is a 59 y.o. female who presents for Pain (Lower back pain. Pt states it is getting bad for the past two weeks. Pt feels she did not do anything different. ) and Referral (Water therapy. And PT. ).    Chronic cervical and lumbar spine issues, she is currently having a flare up. She did Aqua therapy in the past that helped         Review of Systems   Musculoskeletal:  Positive for arthralgias and back pain. Negative for neck stiffness.       Objective   /64   Pulse 84   Temp 36.2 °C (97.2 °F)   Resp 19   Ht 1.702 m (5' 7\")   Wt 80.5 kg (177 lb 6.4 oz)   SpO2 98%   BMI 27.78 kg/m²     Physical Exam  Vitals and nursing note reviewed.   Constitutional:       General: She is not in acute distress.  HENT:      Head: Normocephalic and atraumatic.   Pulmonary:      Effort: Pulmonary effort is normal.   Musculoskeletal:         General: No signs of injury.   Skin:     General: Skin is warm and dry.   Neurological:      Mental Status: She is alert and oriented to person, place, and time.   Psychiatric:         Mood and Affect: Mood normal.         Assessment/Plan   Problem List Items Addressed This Visit             ICD-10-CM    Spondylolisthesis, cervical region - Primary M43.12    Relevant Medications    meloxicam (Mobic) 15 mg tablet    tiZANidine (Zanaflex) 2 mg capsule    Other Relevant Orders    Referral to Physical Therapy    Lumbar spondylosis M47.816    Relevant Medications    meloxicam (Mobic) 15 mg tablet    tiZANidine (Zanaflex) 2 mg capsule    Other Relevant Orders    Referral to Physical Therapy    Chronic left-sided low back pain with left-sided sciatica M54.42, G89.29    Relevant Medications    meloxicam (Mobic) 15 mg tablet    tiZANidine (Zanaflex) 2 mg capsule    Other Relevant Orders    Referral to Physical Therapy          "

## 2025-03-31 ENCOUNTER — EVALUATION (OUTPATIENT)
Dept: PHYSICAL THERAPY | Facility: CLINIC | Age: 60
End: 2025-03-31
Payer: COMMERCIAL

## 2025-03-31 DIAGNOSIS — G89.29 CHRONIC LEFT-SIDED LOW BACK PAIN WITH LEFT-SIDED SCIATICA: ICD-10-CM

## 2025-03-31 DIAGNOSIS — M62.81 MUSCLE WEAKNESS: ICD-10-CM

## 2025-03-31 DIAGNOSIS — M54.42 CHRONIC LEFT-SIDED LOW BACK PAIN WITH LEFT-SIDED SCIATICA: ICD-10-CM

## 2025-03-31 DIAGNOSIS — M54.9 BACK PAIN: Primary | ICD-10-CM

## 2025-03-31 DIAGNOSIS — M47.816 LUMBAR SPONDYLOSIS: ICD-10-CM

## 2025-03-31 DIAGNOSIS — R26.2 DIFFICULTY WALKING: ICD-10-CM

## 2025-03-31 PROCEDURE — 97161 PT EVAL LOW COMPLEX 20 MIN: CPT | Mod: GP | Performed by: PHYSICAL THERAPIST

## 2025-03-31 PROCEDURE — 97110 THERAPEUTIC EXERCISES: CPT | Mod: GP | Performed by: PHYSICAL THERAPIST

## 2025-03-31 ASSESSMENT — PAIN - FUNCTIONAL ASSESSMENT: PAIN_FUNCTIONAL_ASSESSMENT: 0-10

## 2025-03-31 ASSESSMENT — PAIN SCALES - GENERAL: PAINLEVEL_OUTOF10: 4

## 2025-03-31 NOTE — PROGRESS NOTES
Physical Therapy    Physical Therapy Evaluation and Treatment      Patient Name: Jennifer Juan  MRN: [id number]  Today's Date: 3/31/2025  Insurance:  Med [place]  25% coins, $500 / $1000 ded, $2500 / $5000 oop, no copay, 20v mary yr (0v used as of 3/24/2025), no [place]  53976 - 40526 not covered.  Visit 1 of 20 (re-assess at 10)  Time Entry:   Time Calculation  Start Time: 1030  Stop Time: 1115  Time Calculation (min): 45 min  PT Evaluation Time Entry  PT Evaluation (Low) Time Entry: 30  PT Therapeutic Procedures Time Entry  Therapeutic Exercise Time Entry: 15               Assessment:    Pt is a 58 yo female presenting with back and LE pain, weakness, and decreased functional abilities like transfers consistent with nerve impingement and radicular involvement. Skilled PT is recommended to address these issues initially with pool therapy to facilitate core stabilization in easier environment, as well as land-based PT to use manual therapy to mobilize tissue and joints, with stretching and progressive strengthening of core and LEs so that she is better able to have functional translation of progress.     Plan:  OP PT Plan  Treatment/Interventions: Aquatic therapy, Dry needling, Education/ Instruction, Electrical stimulation, Gait training, Manual therapy, Mechanical traction, Neuromuscular re-education, Self care/ home management, Therapeutic activities, Therapeutic exercises  PT Plan: Skilled PT  PT Frequency:  (1-2x/week)  Duration: 20  Rehab Potential: Good  Plan of Care Agreement: Patient    Current Problem:   1. Back pain  Follow Up In Physical Therapy      2. Chronic left-sided low back pain with left-sided sciatica  Referral to Physical Therapy      3. Lumbar spondylosis  Referral to Physical Therapy      4. Difficulty walking        5. Muscle weakness            Subjective    General:   Pt reports that she has had back pain and nerve issues for several years, had treatment on and off.   For the past few weeks, her L  sciatic area (buttock into post-lat hip  She noted that transfer started getting more difficult as well, and noted L knee and R foot started hurting.    End of January, she had a pipe burst in her basement. All of the lifting and attempts at vacuuming up the water caused extra pain.   She also had a day around the holidays where she was plowed in and the shoveling exacerbated her pain     Precautions:  Precautions  Precautions Comment: no signif falls recently.  Pain:  Pain Assessment  Pain Assessment: 0-10  0-10 (Numeric) Pain Score: 4Current: 4/10  Worst: 7-8/10 (worse at the end of the day)  Best: 2-3/10  Location: L buttock, L hip and post-lat thigh, and R foot.    Home Living:     Current Level of Function:   walking gregorio= immed pain, has WB gregorio of up to an hour.   standing gregorio= limited by pain, <30 min  lifting gregorio= ~25# from floor to waist.  stairs: Bothers her after frequent stair climbing.  supine to sit transfers: mild difficulty and pain.   sit to stand transfers: mild pain, has been using BUE assist more lately.    Objective     L spine ROM (2 inclinometers):  flex= 22*  ext= 10*  R/L lat flex= 10*/13*    Strength R/L:  Hip flex: 28#/24#  Hip ext: 36#/34#  Hip abduct: 21#/20#  Hip adduct: 20#/20#  Quads: 22#/27# p!  Hams: 26#/15#  DF: 25#/25#    90-90 hamstring flexibility R/L: 75*/75*    FADIR R/L: tightness bilaterally, pinching in L ant hip.  POOJA R/L:  moderately tight bilaterally.    Outcome Measures:  Other Measures  Oswestry Disablity Index (YEE): 46%     Treatments:  Ther Ex 36100: 15/1  Hip flexor stretch off EOB :30 1x  KTOS :30 1x  BKFO :05 5x  PPT in flat supine, 10x    EDUCATION:   Pt given HEP of exercises performed today.    Goals:  Goals to be achieved by discharge, approx 12 weeks.    Patient will verbalize pain (0-10) = 0/10 at rest and 3/10 at worst with activity.    Patient will correctly perform home exercise program to progress current functional status.    Modified Oswestry rating  score < 20 % to demonstrate overall improved functional abilities.    Increase lumbar ROM: lat flex= 20*    Increase LE strength: hip flex= 30#, abduct= 30#, adduct= 25#, quads= 40#, hams= 30#    Increase walking gregorio to 45 min without signif inc in pain to improve community amb. And shopping.    Pt will manage lifting 25# from floor to waist with good form, no inc in pain to manage pet food bags.     Pt will transfer supine <-->sit and sit <-->stand without pain, using good form.

## 2025-04-09 ENCOUNTER — TREATMENT (OUTPATIENT)
Dept: PHYSICAL THERAPY | Facility: CLINIC | Age: 60
End: 2025-04-09
Payer: COMMERCIAL

## 2025-04-09 DIAGNOSIS — S06.0X0A CONCUSSION WITHOUT LOSS OF CONSCIOUSNESS, INITIAL ENCOUNTER: ICD-10-CM

## 2025-04-09 DIAGNOSIS — M54.9 BACK PAIN: Primary | ICD-10-CM

## 2025-04-09 DIAGNOSIS — M62.81 MUSCLE WEAKNESS: ICD-10-CM

## 2025-04-09 DIAGNOSIS — M25.551 RIGHT HIP PAIN: ICD-10-CM

## 2025-04-09 DIAGNOSIS — R26.2 DIFFICULTY WALKING: ICD-10-CM

## 2025-04-09 PROCEDURE — 97113 AQUATIC THERAPY/EXERCISES: CPT | Mod: GP,CQ

## 2025-04-09 NOTE — PROGRESS NOTES
Patient Name: Jennifer Juan  MRN: [id number]  Today's Date: 4/9/2025  Time Calculation  Start Time: 1405  Stop Time: 1440  Time Calculation (min): 35 min  Insurance:  Med [place]  25% coins, $500 / $1000 ded, $2500 / $5000 oop, no copay, 20v mary yr (0v used as of 3/24/2025), no [place]  20560 - 20561 not covered.  Visit 2 of 20 (re-assess at 10)  Subjective:  Reports that she feels that she is not as strong as she should be and would like to eventually be able to tolerate a strengthening program independently without increasing nerve or LB symptoms. Reports compliance with issued HEP no concerns.     Objective:  Instructed in initial skilled aquatic therapeutic exercises .    Assessment:   Pt is familiar to this location and to treating PTA due to participation in aquatic PT in the past . Decreased lumbo-pelvic stabilization noted. Increased  L glute nerve symptoms with performance of R CKC hip movements . Overall no increased in pain intensity.     Plan:   Ask response and progress as able     Treatment :   AQUATIC THERAPEUTIC EXERCISE 70249 35 minutes 2 units   Aquatic gait forward/Bwd/Lateral March x3 laps each   HS/GS Stretches   Wall plank with push up  with cervical stabilization at ladder x15   Hip 3 way 2x10  Hip circles x10 each   Squats x15   DLS 3 way  NV   Trunk Rotation with noodle x10   Noodle Pull down x15   Rows  SKTC 1x30   DKTC 1x30 each           Current Problem:  1. Back pain  Follow Up In Physical Therapy      2. Concussion without loss of consciousness, initial encounter        3. Right hip pain        4. Difficulty walking        5. Muscle weakness                Pain:  5/10    Location: L glute           Precautions: No recent falls to report

## 2025-04-14 ENCOUNTER — TELEMEDICINE (OUTPATIENT)
Dept: NEUROLOGY | Facility: CLINIC | Age: 60
End: 2025-04-14
Payer: COMMERCIAL

## 2025-04-14 DIAGNOSIS — M48.02 CERVICAL STENOSIS OF SPINE: Primary | ICD-10-CM

## 2025-04-14 PROCEDURE — 99214 OFFICE O/P EST MOD 30 MIN: CPT | Performed by: NURSE PRACTITIONER

## 2025-04-14 NOTE — PROGRESS NOTES
Virtual or Telephone Consent    An interactive audio and video telecommunication system which permits real time communications between the patient (at the originating site) and provider (at the distant site) was utilized to provide this telehealth service.   Verbal consent was requested and obtained from Jennifer Shawnececi on this date, 04/14/25 for a telehealth visit and the patient's location was confirmed at the time of the visit.    Patient being assessed today for follow-up of cervical neck pain and to review results of her cervical spine MRI.  Reviewed those results with patient she states that she continues to have some pain that goes up into her head as well as down into her left shoulder.  Referral placed for neurosurgery evaluation with Dr. Barragan.  Also recommend patient to start physical therapy to see if that helps give her some relief.  Referral placed for that as well.  Patient in agreement with plan.    This note was created with voice recognition software and was not corrected for typographical or grammatical errors

## 2025-04-16 ENCOUNTER — TREATMENT (OUTPATIENT)
Dept: PHYSICAL THERAPY | Facility: CLINIC | Age: 60
End: 2025-04-16
Payer: COMMERCIAL

## 2025-04-16 DIAGNOSIS — M25.551 RIGHT HIP PAIN: ICD-10-CM

## 2025-04-16 DIAGNOSIS — M62.81 MUSCLE WEAKNESS: ICD-10-CM

## 2025-04-16 DIAGNOSIS — M54.9 BACK PAIN: Primary | ICD-10-CM

## 2025-04-16 DIAGNOSIS — S06.0X0A CONCUSSION WITHOUT LOSS OF CONSCIOUSNESS, INITIAL ENCOUNTER: ICD-10-CM

## 2025-04-16 DIAGNOSIS — R26.2 DIFFICULTY WALKING: ICD-10-CM

## 2025-04-16 PROCEDURE — 97113 AQUATIC THERAPY/EXERCISES: CPT | Mod: GP,CQ

## 2025-04-16 NOTE — PROGRESS NOTES
Patient Name: Jennifer Juan  MRN: [id number]  Today's Date: 4/16/2025  Time Calculation  Start Time: 1410  Stop Time: 1440  Time Calculation (min): 30 min  Insurance:  Med [place]  25% coins, $500 / $1000 ded, $2500 / $5000 oop, no copay, 20v mary yr (0v used as of 3/24/2025), no [place]  54598 - 96503 not covered.  Visit 3 of 20 (re-assess at 10)  Subjective:  States that overall symptoms remain unchanged since starting PT.  10 minutes late for session   Objective:  Decreased L LE motor control     Assessment:   PT requires decreased range with L LE movements due to noticeable increased lumbar compensation and decreased control with performance of exercises.    Plan:   Progress exercises as able     Treatment :   AQUATIC THERAPEUTIC EXERCISE 55085 20 minutes 1 unit / 30 minutes total 10 minutes no charge   Aquatic gait forward/Bwd/Lateral March x3 laps each independent   HS/GS Stretches   Horacio stretch   Wall plank with push up  with cervical stabilization at ladder x15 independent   Hip 3 way 2x10  Hip circles x10 each   Squats x15   DLS 3 way  level1 wall support x10 each   Trunk Rotation with noodle x10  NV   Noodle Pull down x15  NV   Rows NV   SKTC 1x30   DKTC 1x30 each             Current Problem:  1. Back pain  Follow Up In Physical Therapy      2. Concussion without loss of consciousness, initial encounter        3. Right hip pain        4. Difficulty walking        5. Muscle weakness                Pain:  4/10   Location: LB          Precautions: No recent falls

## 2025-04-23 ENCOUNTER — TREATMENT (OUTPATIENT)
Dept: PHYSICAL THERAPY | Facility: CLINIC | Age: 60
End: 2025-04-23
Payer: COMMERCIAL

## 2025-04-23 DIAGNOSIS — M25.551 RIGHT HIP PAIN: ICD-10-CM

## 2025-04-23 DIAGNOSIS — R26.2 DIFFICULTY WALKING: ICD-10-CM

## 2025-04-23 DIAGNOSIS — S06.0X0A CONCUSSION WITHOUT LOSS OF CONSCIOUSNESS, INITIAL ENCOUNTER: ICD-10-CM

## 2025-04-23 DIAGNOSIS — M54.9 BACK PAIN: Primary | ICD-10-CM

## 2025-04-23 DIAGNOSIS — M62.81 MUSCLE WEAKNESS: ICD-10-CM

## 2025-04-23 PROCEDURE — 97113 AQUATIC THERAPY/EXERCISES: CPT | Mod: GP,CQ

## 2025-04-23 NOTE — PROGRESS NOTES
Patient Name: Jennifer Juna  MRN: [id number]  Today's Date: 4/23/2025  Time Calculation  Start Time: 0215  Stop Time: 0245  Time Calculation (min): 30 min  Insurance:  Med [place]  25% coins, $500 / $1000 ded, $2500 / $5000 oop, no copay, 20v mary yr (0v used as of 3/24/2025), no [place]  91374 - 46610 not covered.  Visit 4 of 20 (re-assess at 10)  Subjective:  Pt reports increased L UT /cervical symptoms today. States that post last session that evening she started to have increased pain and tightness.     Objective:  15 minutes late for session   Discussed POC   Assessment:   UE involvement with DLS exercises held to assess response.  Duration limited due to limited available time with session due to tardiness.     Plan:   Ask response with modified program   PT to schedule land sessions to treat cervical   Treatment :   Pt 15 minutes late . 15 minutes independent   AQUATIC THERAPEUTIC EXERCISE 43519 15 minutes 1 unit   15 minutes independent   Aquatic gait forward/Bwd/Lateral March x3 laps each independent   HS/GS Stretches   Horacio stretch   Wall plank with push up  with cervical stabilization at ladder x15 held   Hip 3 way 2x10  Hip circles x10 each   Squats x15   DLS 3 way  level1 wall support x10 each  held   Trunk Rotation with noodle x10  NV   Noodle Pull down x15  NV   Rows NV   SKTC 1x30   DKTC 1x30 each               Current Problem:  1. Back pain  Follow Up In Physical Therapy      2. Concussion without loss of consciousness, initial encounter        3. Right hip pain        4. Difficulty walking        5. Muscle weakness                Pain:  4/10    Location: L UT /cervical base

## 2025-04-30 ENCOUNTER — APPOINTMENT (OUTPATIENT)
Dept: PHYSICAL THERAPY | Facility: CLINIC | Age: 60
End: 2025-04-30
Payer: COMMERCIAL

## 2025-04-30 DIAGNOSIS — R26.2 DIFFICULTY WALKING: ICD-10-CM

## 2025-04-30 DIAGNOSIS — S06.0X0A CONCUSSION WITHOUT LOSS OF CONSCIOUSNESS, INITIAL ENCOUNTER: ICD-10-CM

## 2025-04-30 DIAGNOSIS — M62.81 MUSCLE WEAKNESS: ICD-10-CM

## 2025-04-30 DIAGNOSIS — M48.061 SPINAL STENOSIS OF LUMBAR REGION WITHOUT NEUROGENIC CLAUDICATION: ICD-10-CM

## 2025-04-30 DIAGNOSIS — M25.551 RIGHT HIP PAIN: ICD-10-CM

## 2025-04-30 DIAGNOSIS — M54.9 BACK PAIN: Primary | ICD-10-CM

## 2025-05-02 ENCOUNTER — APPOINTMENT (OUTPATIENT)
Dept: PHYSICAL THERAPY | Facility: CLINIC | Age: 60
End: 2025-05-02
Payer: COMMERCIAL

## 2025-05-02 DIAGNOSIS — R26.2 DIFFICULTY WALKING: ICD-10-CM

## 2025-05-02 DIAGNOSIS — M54.9 BACK PAIN: Primary | ICD-10-CM

## 2025-05-02 DIAGNOSIS — M62.81 MUSCLE WEAKNESS: ICD-10-CM

## 2025-05-05 ENCOUNTER — APPOINTMENT (OUTPATIENT)
Dept: PHYSICAL THERAPY | Facility: CLINIC | Age: 60
End: 2025-05-05
Payer: COMMERCIAL

## 2025-05-05 DIAGNOSIS — M54.9 BACK PAIN: Primary | ICD-10-CM

## 2025-05-05 DIAGNOSIS — R26.2 DIFFICULTY WALKING: ICD-10-CM

## 2025-05-05 DIAGNOSIS — M62.81 MUSCLE WEAKNESS: ICD-10-CM

## 2025-05-05 RX ORDER — GABAPENTIN 600 MG/1
1200 TABLET ORAL 3 TIMES DAILY
Qty: 540 TABLET | Refills: 0 | Status: SHIPPED | OUTPATIENT
Start: 2025-05-05

## 2025-05-07 ENCOUNTER — TREATMENT (OUTPATIENT)
Dept: PHYSICAL THERAPY | Facility: CLINIC | Age: 60
End: 2025-05-07
Payer: COMMERCIAL

## 2025-05-07 DIAGNOSIS — M54.9 BACK PAIN: Primary | ICD-10-CM

## 2025-05-07 DIAGNOSIS — R26.2 DIFFICULTY WALKING: ICD-10-CM

## 2025-05-07 DIAGNOSIS — M62.81 MUSCLE WEAKNESS: ICD-10-CM

## 2025-05-07 DIAGNOSIS — M25.551 RIGHT HIP PAIN: ICD-10-CM

## 2025-05-07 DIAGNOSIS — S06.0X0A CONCUSSION WITHOUT LOSS OF CONSCIOUSNESS, INITIAL ENCOUNTER: ICD-10-CM

## 2025-05-07 PROCEDURE — 97113 AQUATIC THERAPY/EXERCISES: CPT | Mod: GP,CQ

## 2025-05-07 NOTE — PROGRESS NOTES
Patient Name: Jennifer Juan  MRN: [id number]  Today's Date: 5/9/2025  Time Calculation  Start Time: 1415  Stop Time: 1445  Time Calculation (min): 30 min  Insurance:  Med [place]  25% coins, $500 / $1000 ded, $2500 / $5000 oop, no copay, 20v mary yr (0v used as of 3/24/2025), no [place]  22604 - 62577 not covered.  Visit 5 of 20 (re-assess at 10)  Subjective:  15 minutes late for session .Reports that cervical symptoms have been lessened since modified aquatic sessions however still has difficulty with performing Cervical range of motion and is looking forward to beginning land PT for cervical symptoms due to cervical PT helped in the past . Reports that aquatics is allowing her to strengthen her back without increasing her LB/R hip pain.     Objective:  Guarded cervical ROM. Decreased thoracic kyphosis  Assessment:   UE movements still held today due to reported improved symptoms with modified program. Tardiness limited duration of treatment.     Plan:   Progress aquatic sessions as able     Treatment :   AQUATIC THERAPEUTIC EXERCISE 50246   215-230 one on one instruction   230-245 independent     Aquatic gait forward/Bwd/Lateral March x3 laps each independent   HS/GS Stretches 2x30   Horacio stretch 1x30   Wall plank with push up  with cervical stabilization at ladder x15 held   Hip 3 way 2x10  Hip circles x10 each   Squats x15   DLS 3 way  level1 wall support x10 each  held   Trunk Rotation with noodle x10  NV   Noodle Pull down x15  NV   Rows NV   SKTC 1x30   DKTC 1x30 each            Current Problem:  1. Back pain  Follow Up In Physical Therapy      2. Concussion without loss of consciousness, initial encounter        3. Right hip pain        4. Difficulty walking        5. Muscle weakness                Pain:  4-5/10    Location: Lumbar /cervical

## 2025-05-08 ENCOUNTER — TREATMENT (OUTPATIENT)
Dept: PHYSICAL THERAPY | Facility: CLINIC | Age: 60
End: 2025-05-08
Payer: COMMERCIAL

## 2025-05-08 DIAGNOSIS — M54.9 BACK PAIN: Primary | ICD-10-CM

## 2025-05-08 DIAGNOSIS — M54.2 NECK PAIN: ICD-10-CM

## 2025-05-08 DIAGNOSIS — M62.81 MUSCLE WEAKNESS: ICD-10-CM

## 2025-05-08 DIAGNOSIS — R26.2 DIFFICULTY WALKING: ICD-10-CM

## 2025-05-08 PROCEDURE — 97110 THERAPEUTIC EXERCISES: CPT | Mod: GP | Performed by: PHYSICAL THERAPIST

## 2025-05-08 PROCEDURE — 97140 MANUAL THERAPY 1/> REGIONS: CPT | Mod: GP | Performed by: PHYSICAL THERAPIST

## 2025-05-08 PROCEDURE — 97164 PT RE-EVAL EST PLAN CARE: CPT | Mod: GP | Performed by: PHYSICAL THERAPIST

## 2025-05-08 NOTE — PROGRESS NOTES
Physical Therapy    Physical Therapy Treatment/Re-check    Patient Name: Jennifer Juan  MRN: [id number]  Today's Date: 5/18/2025  Insurance:  Med [place]  25% coins, $500 / $1000 ded, $2500 / $5000 oop, no copay, 20v mary yr (0v used as of 3/24/2025), no [place]  05394 - 89986 not covered.  Visit 6 of 20 (re-assess back at 10, neck at 16)  Time Entry:   Time Calculation  Start Time: 1050  Stop Time: 1145  Time Calculation (min): 55 min  PT Evaluation Time Entry  PT Re-Evaluation Time Entry: 25  PT Therapeutic Procedures Time Entry  Manual Therapy Time Entry: 8  Therapeutic Exercise Time Entry: 16                 Assessment:   Pt is a 58 yo female presenting with signif neck pain, decreased CROM, and UE weakness consistent with cerv degeneration and nerve impingement. These impairments are leading to difficulty with sleeping, drying and styling her hair and others. Skilled PT is recommended to address these issues with manual tissue and joint mobilization, stretching, traction, and progressive strengthening of cerv muscles and postural muscles to maximize mechanical efficiency and reduce nerve impingement as mch as possible.     Plan:  OP PT Plan  Treatment/Interventions: Aquatic therapy, Dry needling, Education/ Instruction, Electrical stimulation, Manual therapy, Mechanical traction, Neuromuscular re-education, Self care/ home management, Therapeutic activities, Therapeutic exercises  PT Plan: Skilled PT  PT Frequency:  (1-2x/week)  Duration: 20  Rehab Potential: Fair  Plan of Care Agreement: Patient    Current Problem  1. Back pain  Follow Up In Physical Therapy      2. Difficulty walking        3. Muscle weakness        4. Neck pain  Follow Up In Physical Therapy          General   Pt reports that her neck pain has been botering her chronically, and previously had PT and nerve ablations for this. She notes that she has been more cognizant of her neck positioning with normal daily activities to avoid increasing her neck  pain.   She ran into a sidewalk pole while walking with head down July 2024. She had a mild concussion and saw PT for it.        Subjective    Precautions     Pain   Current: 2/10  Worst: 5/10  Best: 2/10    Current Function Level:  Sometimes has difficulty sleeping due to neck. Wakes ~3x/night due to neck or back pain.  Has difficulty stabilizing her neck against leash pull from her dog  Denies pain with reaching OH into cupboards  Has pain when trying to bend head over to dry/style hair  Painful and limited rotation to check for traffic.  Has pain where seat belt crosses over UT area.    Objective     Posture   Minimal cerv lordosis. Minimal mid and lower thoracic kyphosis.    CROM:  flex= 38*  ext= 35*  R/L lat flex= 25*/22*  R/L rotat= 45*/33*    Neuro:   Pt has intermittent paresthesias     Outcome Measures:  Other Measures  Neck Disability Index: 44%    Treatments:  Ther Ex 72561: 16/1  Pillowcase assisted cerv extn (head hammock) :03 5x  Cerv isometrics: flex, ext, lat flex; 5x each  Seated SB hug to stretch into thoracic flex, :15 2x    Manual Ther Tech 13324: 8/1  SOR, light manual cerv traction    OP EDUCATION:   Pt given HEP of exercises performed today. She was edu'd in pool noodle posture assist to promote better thoracic relaxation.     Goals:  Goals to be achieved by discharge, approx  12 weeks.    Patient will verbalize pain (0-10) = 0/10 at rest and 4/10 at worst with activity.    Neck Disability Index score < 25 % to demonstrate overall improved functional abilities.    Increase CROM: flex= 45*, ext= 45*, lat flex= 30*, rotat= 55*.    Pt will maintain upright seated posture when unaware of observation, and be able to self-correct when faulting, and demo good working knowledge of proper postural support.     Increase UE strength: shoulder flex= 16#, ext= 25#, abduct= 16#, IR= 20#, ER= 15#, bicep= 25#, tricep= 25#, wrist ext= 20#.

## 2025-05-09 ENCOUNTER — APPOINTMENT (OUTPATIENT)
Dept: PRIMARY CARE | Facility: CLINIC | Age: 60
End: 2025-05-09
Payer: COMMERCIAL

## 2025-05-09 ENCOUNTER — HOSPITAL ENCOUNTER (OUTPATIENT)
Dept: RADIOLOGY | Facility: CLINIC | Age: 60
Discharge: HOME | End: 2025-05-09
Payer: COMMERCIAL

## 2025-05-09 VITALS
OXYGEN SATURATION: 96 % | SYSTOLIC BLOOD PRESSURE: 123 MMHG | BODY MASS INDEX: 27.63 KG/M2 | TEMPERATURE: 97.5 F | HEART RATE: 82 BPM | WEIGHT: 176.4 LBS | DIASTOLIC BLOOD PRESSURE: 79 MMHG

## 2025-05-09 DIAGNOSIS — S00.83XA CONTUSION OF OTHER PART OF HEAD, INITIAL ENCOUNTER: ICD-10-CM

## 2025-05-09 DIAGNOSIS — M79.18 MYOFASCIAL PAIN: ICD-10-CM

## 2025-05-09 DIAGNOSIS — M53.3 COCCYX PAIN: ICD-10-CM

## 2025-05-09 DIAGNOSIS — M43.10 SPONDYLOLISTHESIS, UNSPECIFIED SPINAL REGION: ICD-10-CM

## 2025-05-09 DIAGNOSIS — M53.3 COCCYX PAIN: Primary | ICD-10-CM

## 2025-05-09 PROBLEM — S93.492A SPRAIN OF ANTERIOR TALOFIBULAR LIGAMENT OF LEFT ANKLE: Status: ACTIVE | Noted: 2024-07-22

## 2025-05-09 PROCEDURE — 72220 X-RAY EXAM SACRUM TAILBONE: CPT

## 2025-05-09 PROCEDURE — 99214 OFFICE O/P EST MOD 30 MIN: CPT | Performed by: INTERNAL MEDICINE

## 2025-05-09 RX ORDER — METHYLPREDNISOLONE 4 MG/1
TABLET ORAL
Qty: 21 TABLET | Refills: 0 | Status: SHIPPED | OUTPATIENT
Start: 2025-05-09 | End: 2025-05-15

## 2025-05-09 RX ORDER — LIDOCAINE 50 MG/G
OINTMENT TOPICAL AS NEEDED
Qty: 50 G | Refills: 3 | Status: SHIPPED | OUTPATIENT
Start: 2025-05-09

## 2025-05-09 RX ORDER — TRAMADOL HYDROCHLORIDE 50 MG/1
50 TABLET ORAL EVERY 6 HOURS PRN
Qty: 10 TABLET | Refills: 0 | Status: SHIPPED | OUTPATIENT
Start: 2025-05-09 | End: 2025-05-14

## 2025-05-09 RX ORDER — TRETINOIN 0.5 MG/G
CREAM TOPICAL
COMMUNITY
Start: 2025-04-10

## 2025-05-09 NOTE — PROGRESS NOTES
Subjective   Patient ID: Jennifer Juan is a 59 y.o. female who presents for Tailbone Pain (Patient is here for tailbone and crotch pain. ).    Here because of worsening pain coccyx,perianal area with radiation to groin and upper legs,she had a herpetic lesion in barry anal,she took Valtrex  She has been taking Advil,Tylenol,Gabapentin without relief,adding Lidocaine patches.  She fell 2 weeks ago but no injury at that time  She continues to see pain management  She also sees psychiatrist.  No urinary incontinence  She spoke to her gastroenterologist and will have colonoscopy to further evaluate her pain around the anal area.  She is no longer having problem from her concussion         Review of Systems   Musculoskeletal:         As HPI       Objective   /79 (BP Location: Left arm, Patient Position: Sitting, BP Cuff Size: Adult)   Pulse 82   Temp 36.4 °C (97.5 °F) (Temporal)   Wt 80 kg (176 lb 6.4 oz)   SpO2 96%   BMI 27.63 kg/m²     Physical Exam  Constitutional:       Appearance: Normal appearance.   HENT:      Head: Normocephalic and atraumatic.   Eyes:      Extraocular Movements: Extraocular movements intact.      Pupils: Pupils are equal, round, and reactive to light.   Cardiovascular:      Rate and Rhythm: Normal rate and regular rhythm.      Heart sounds: Normal heart sounds.   Pulmonary:      Effort: Pulmonary effort is normal.      Breath sounds: Normal breath sounds. No wheezing or rhonchi.   Abdominal:      General: Abdomen is flat. Bowel sounds are normal. There is no distension.      Palpations: Abdomen is soft.   Musculoskeletal:      Cervical back: Normal range of motion and neck supple.      Right lower leg: No edema.      Left lower leg: No edema.      Comments: Pain over lower lumbar spine area with palpation  Skin did not show any lesion.   Skin:     General: Skin is warm.   Neurological:      General: No focal deficit present.      Mental Status: She is alert and oriented to person,  place, and time.   Psychiatric:         Mood and Affect: Mood normal.         Behavior: Behavior normal.         Assessment/Plan   Problem List Items Addressed This Visit           ICD-10-CM    Head contusion S00.93XA    Symptoms resolved         Myofascial pain M79.18    Relevant Medications    lidocaine (Xylocaine) 5 % ointment    Spondylolisthesis M43.10    Follow-up with pain management         Coccyx pain - Primary M53.3    Will order x-ray of the coccyx  Will treat with Medrol Dosepak and lidocaine patches.  Refer back to pain management.  I agree with colonoscopy to further evaluate her pain.         Relevant Medications    methylPREDNISolone (Medrol Dospak) 4 mg tablets    traMADol (Ultram) 50 mg tablet    Other Relevant Orders    XR sacrum coccyx 2+ views (Completed)

## 2025-05-11 NOTE — ASSESSMENT & PLAN NOTE
Will order x-ray of the coccyx  Will treat with Medrol Dosepak and lidocaine patches.  Refer back to pain management.  I agree with colonoscopy to further evaluate her pain.

## 2025-05-14 ENCOUNTER — TREATMENT (OUTPATIENT)
Dept: PHYSICAL THERAPY | Facility: CLINIC | Age: 60
End: 2025-05-14
Payer: COMMERCIAL

## 2025-05-14 DIAGNOSIS — R26.2 DIFFICULTY WALKING: ICD-10-CM

## 2025-05-14 DIAGNOSIS — M62.81 MUSCLE WEAKNESS: ICD-10-CM

## 2025-05-14 DIAGNOSIS — M54.9 BACK PAIN: Primary | ICD-10-CM

## 2025-05-14 PROCEDURE — 97113 AQUATIC THERAPY/EXERCISES: CPT | Mod: GP,CQ

## 2025-05-14 NOTE — PROGRESS NOTES
Patient Name: Jennifer Juan  MRN: [id number]  Today's Date: 5/14/2025  Time Calculation  Start Time: 0935  Stop Time: 1000  Time Calculation (min): 25 min  Insurance:  Med [place]  25% coins, $500 / $1000 ded, $2500 / $5000 oop, no copay, 20v mary yr (0v used as of 3/24/2025), no [place]  60778 - 20561 not covered.  Visit 7 of 20 (re-assess at 10)  Subjective:  Reports compliance with newly added cervical exercises. States that she did have increased cervical symptoms initially post land session. However feels that exercises are helping . Currently reports R hip pain that was increased post taking dog for a walk. Site of pain lateral/post R hip . Did have some R groin discomfort as well  but no groin pain currently.     Objective:  Gait on pool deck decreased R hip extension / Lateral trunk lean with R LE loading     Assessment:   Decreased glute activation and stability possibly cause for increased symptoms reported today .  Stretching added to decrease hip stiffness.     Plan:   Cont to improve Core /hip strength .    Treatment :      AQUATIC THERAPEUTIC EXERCISE 86145     Aquatic gait forward/Bwd/Lateral March x3 laps   HS/GS Stretches 2x30    Horacio /Piriformis 2x30   Wall plank with push up  with cervical stabilization at ladder x15 held   Hip 3 way 2x10  Hip circles x10 each   Squats x15   DLS 3 way  level1 wall support x10 each  held   Trunk Rotation with noodle x10  NV   Noodle Pull down x15  NV   Rows NV   SKTC 1x30   DKTC 1x30 each      Current Problem:  1. Back pain  Follow Up In Physical Therapy      2. Difficulty walking        3. Muscle weakness

## 2025-05-15 ENCOUNTER — OFFICE VISIT (OUTPATIENT)
Dept: NEUROLOGY | Facility: CLINIC | Age: 60
End: 2025-05-15
Payer: COMMERCIAL

## 2025-05-15 ENCOUNTER — HOSPITAL ENCOUNTER (OUTPATIENT)
Dept: RADIOLOGY | Facility: CLINIC | Age: 60
Discharge: HOME | End: 2025-05-15
Payer: COMMERCIAL

## 2025-05-15 ENCOUNTER — PATIENT MESSAGE (OUTPATIENT)
Dept: PRIMARY CARE | Facility: CLINIC | Age: 60
End: 2025-05-15

## 2025-05-15 VITALS
WEIGHT: 172 LBS | HEIGHT: 67 IN | BODY MASS INDEX: 27 KG/M2 | HEART RATE: 72 BPM | DIASTOLIC BLOOD PRESSURE: 74 MMHG | SYSTOLIC BLOOD PRESSURE: 116 MMHG

## 2025-05-15 DIAGNOSIS — M47.816 LUMBAR SPONDYLOSIS: ICD-10-CM

## 2025-05-15 DIAGNOSIS — M54.16 LUMBAR RADICULOPATHY: Primary | ICD-10-CM

## 2025-05-15 DIAGNOSIS — M25.532 ACUTE PAIN OF LEFT WRIST: ICD-10-CM

## 2025-05-15 DIAGNOSIS — M48.061 SPINAL STENOSIS OF LUMBAR REGION WITHOUT NEUROGENIC CLAUDICATION: Primary | ICD-10-CM

## 2025-05-15 PROCEDURE — 73110 X-RAY EXAM OF WRIST: CPT | Mod: LT

## 2025-05-15 PROCEDURE — 3008F BODY MASS INDEX DOCD: CPT | Performed by: NURSE PRACTITIONER

## 2025-05-15 PROCEDURE — 99214 OFFICE O/P EST MOD 30 MIN: CPT | Performed by: NURSE PRACTITIONER

## 2025-05-15 PROCEDURE — 73110 X-RAY EXAM OF WRIST: CPT | Mod: LEFT SIDE | Performed by: RADIOLOGY

## 2025-05-15 RX ORDER — GABAPENTIN 600 MG/1
1200 TABLET ORAL 3 TIMES DAILY
Qty: 540 TABLET | Refills: 1 | Status: SHIPPED | OUTPATIENT
Start: 2025-05-15

## 2025-05-15 NOTE — PROGRESS NOTES
Patient being assessed today for follow-up chronic lumbar spine pain.  She reports that the gabapentin as effective as it can be at this point.  Will try getting her approved for horizant which her insurance is denied in the past.  There is no exacerbation at this time and she is able to manage her symptoms well at this time.  PT just started last week so we are hopeful that as that continues that will help improve her neck range of motion and discomfort.  She did recently suffer an injury to the left hand and wrist.  Area is edematous and does appear to have some ecchymosis minimally.  We will get an x-ray of the left wrist to rule out any fracture.  Recommend rest ice compression and elevation.  Continue the gabapentin at 1200 mg 3 times daily.  [place]ARRS report reviewed.  Patient denies side effects.  Discussed role of medicine, controlled substance policy, abuse potential, importance of taking medications, potential risks, benefits, and precautions to be taken.  Reviewed sleep hygiene and dietary modifications.  Follow-up in 6 months or sooner if needed.    This note was created with voice recognition software and was not corrected for typographical or grammatical errors

## 2025-05-16 DIAGNOSIS — B00.9 HERPES SIMPLEX: Primary | ICD-10-CM

## 2025-05-16 RX ORDER — VALACYCLOVIR HYDROCHLORIDE 500 MG/1
500 TABLET, FILM COATED ORAL DAILY
Qty: 90 TABLET | Refills: 3 | Status: SHIPPED | OUTPATIENT
Start: 2025-05-16

## 2025-05-21 ENCOUNTER — APPOINTMENT (OUTPATIENT)
Dept: PHYSICAL THERAPY | Facility: CLINIC | Age: 60
End: 2025-05-21
Payer: COMMERCIAL

## 2025-05-21 DIAGNOSIS — M62.81 MUSCLE WEAKNESS: ICD-10-CM

## 2025-05-21 DIAGNOSIS — M54.9 BACK PAIN: Primary | ICD-10-CM

## 2025-05-21 DIAGNOSIS — R26.2 DIFFICULTY WALKING: ICD-10-CM

## 2025-05-28 ENCOUNTER — TREATMENT (OUTPATIENT)
Dept: PHYSICAL THERAPY | Facility: CLINIC | Age: 60
End: 2025-05-28
Payer: COMMERCIAL

## 2025-05-28 DIAGNOSIS — R26.2 DIFFICULTY WALKING: ICD-10-CM

## 2025-05-28 DIAGNOSIS — M62.81 MUSCLE WEAKNESS: ICD-10-CM

## 2025-05-28 DIAGNOSIS — M54.9 BACK PAIN: Primary | ICD-10-CM

## 2025-05-28 PROCEDURE — 97113 AQUATIC THERAPY/EXERCISES: CPT | Mod: GP,CQ

## 2025-05-28 NOTE — PROGRESS NOTES
Patient Name: Jennifer Juan  MRN: [id number]  Today's Date: 5/28/2025  Time Calculation  Start Time: 1101  Stop Time: 1135  Time Calculation (min): 34 min  Visit 8 of 20 (re-assess at 10)   Subjective:  Reports R post hip /R Thoracic pain . States that she had increased activity over the weekend due to a wedding  which may have caused it .     Objective:      Assessment:   Pt demos improved postural awareness and endurance  today     Plan:   Cont with land /pool combination    Treatment :    AQUATIC THERAPEUTIC EXERCISE 20711      Aquatic gait forward/Bwd/Lateral March x3 laps   HS/GS Stretches 2x30    Horacio /Piriformis 2x30   Wall plank with push up  with cervical stabilization at ladder x15 held   Hip 3 way 2x10  Hip circles x10 each   Squats x15   DLS 3 way  level1 wall support x10 each  held   Trunk Rotation with noodle x10  NV   Noodle Pull down x15  NV   Rows NV   SKTC 1x30   DKTC 1x30 each               Current Problem:  1. Back pain        2. Difficulty walking        3. Muscle weakness                Pain:      Location: ***   Description: ***   Aggravating Factors: {Aggravating Factors:75532}   Relieving Factors:  {Relieving Factors:39601}        Precautions: ***

## 2025-06-04 ENCOUNTER — TREATMENT (OUTPATIENT)
Dept: PHYSICAL THERAPY | Facility: CLINIC | Age: 60
End: 2025-06-04
Payer: COMMERCIAL

## 2025-06-04 DIAGNOSIS — M54.9 BACK PAIN: Primary | ICD-10-CM

## 2025-06-04 DIAGNOSIS — M62.81 MUSCLE WEAKNESS: ICD-10-CM

## 2025-06-04 DIAGNOSIS — R26.2 DIFFICULTY WALKING: ICD-10-CM

## 2025-06-04 PROCEDURE — 97113 AQUATIC THERAPY/EXERCISES: CPT | Mod: GP,CQ

## 2025-06-04 NOTE — PROGRESS NOTES
Patient Name: Jennifer Juan  MRN: [id number]  Today's Date: 6/4/2025  Time Calculation  Start Time: 1038  Stop Time: 1108  Time Calculation (min): 30 min  Visit 9 of 20 (re-assess at 10)   Subjective:  States that the last aquatic session helped decrease LBP and soreness. Location of current discomfort R/L glute /LB     Objective:  Gait decreased stride  length B ly    Assessment:   Aquatic environment beneficial in allow ing pt to perform exercises without increasing  overall symptoms .  Pt did report slight increase hip/LB symptoms with performance of hip extension exercises.   Plan:   Land session NV     Treatment :   One on one instruction 10:38- 1108 30 Minutes     AQUATIC THERAPEUTIC EXERCISE 89978      Aquatic gait forward/Bwd/Lateral March x3 laps   HS/GS Stretches 2x30    Horacio /Piriformis 2x30   Wall plank with push up  with cervical stabilization at ladder x15 held   Hip 3 way 2x10  Hip circles x10 each   Squats x15   DLS 3 way  level1 wall support x10 each  held   Trunk Rotation with noodle x10  NV   Noodle Pull down x15  NV   Rows NV   SKTC 1x30   DKTC 1x30 each              Current Problem:  1. Back pain  Follow Up In Physical Therapy      2. Difficulty walking        3. Muscle weakness

## 2025-06-10 ENCOUNTER — APPOINTMENT (OUTPATIENT)
Dept: PHYSICAL THERAPY | Facility: CLINIC | Age: 60
End: 2025-06-10
Payer: COMMERCIAL

## 2025-06-10 ENCOUNTER — TREATMENT (OUTPATIENT)
Dept: PHYSICAL THERAPY | Facility: CLINIC | Age: 60
End: 2025-06-10
Payer: COMMERCIAL

## 2025-06-10 DIAGNOSIS — M54.9 BACK PAIN: Primary | ICD-10-CM

## 2025-06-10 DIAGNOSIS — R26.2 DIFFICULTY WALKING: ICD-10-CM

## 2025-06-10 DIAGNOSIS — M62.81 MUSCLE WEAKNESS: ICD-10-CM

## 2025-06-10 PROCEDURE — 97110 THERAPEUTIC EXERCISES: CPT | Mod: GP | Performed by: PHYSICAL THERAPIST

## 2025-06-10 PROCEDURE — 97140 MANUAL THERAPY 1/> REGIONS: CPT | Mod: GP | Performed by: PHYSICAL THERAPIST

## 2025-06-10 NOTE — PROGRESS NOTES
Physical Therapy    Physical Therapy Treatment/Re-check    Patient Name: Jennifer Juan  MRN: [id number]  Today's Date: 6/10/2025  Insurance:  Med [place]  25% coins, $500 / $1000 ded, $2500 / $5000 oop, no copay, 20v mary yr (0v used as of 3/24/2025), no [place]  89517 - 30578 not covered.  Visit 6 of 20 (re-assess back at 10, neck at 16)  Time Entry:                           Current Problem  1. Back pain        2. Difficulty walking        3. Muscle weakness            General   Pt c/o new-jammie pain across lumbosacral area. It got worse after dancing at a wedding.   Pt notes that the water therapy was sort of helpful, but she cont to have pain with stair amb.    Subjective    Precautions     Pain       Objective     Posture   Minimal cerv lordosis. Minimal mid and lower thoracic kyphosis.    CROM:    flex= 38*  ext= 35*  R/L lat flex= 25*/22*  R/L rotat= 45*/33*    UE Strength: R/L:  Shoulder flex:  Shoulder ext:  Shoulder abduct:  IR:  ER:  Bicep:  Tricep:  Wrist ext:     Neuro:     Pt has intermittent paresthesias     Outcome Measures:       Treatments:  Ther Ex 04761: 16/1    Pillowcase assisted cerv extn (head hammock) :03 5x  Cerv isometrics: flex, ext, lat flex; 5x each  Seated SB hug to stretch into thoracic flex, :15 2x    Manual Ther Tech 84088: 8/1    SOR, light manual cerv traction    OP EDUCATION:       Assessment:      Plan:   Cont 1x/week for 10 visits to address     Goals:  Goals to be achieved by discharge, approx  12 weeks.  Patient will verbalize pain (0-10) = 0/10 at rest and 3/10 at worst with activity.  (6-: neck 2-5/10, back 4-6/10)     Patient will correctly perform home exercise program to progress current functional status.  (6-: HEP daily, back HEP is every morning)     Modified Oswestry rating score < 20 % to demonstrate overall improved functional abilities.     Increase lumbar ROM: lat flex= 20*     Increase LE strength: hip flex= 30#, abduct= 30#, adduct= 25#, quads= 40#, hams=  30#     Increase walking gregorio to 45 min without signif inc in pain to improve community amb. And shopping.  (6-: walking gregorio= 30 min)     Pt will manage lifting 25# from floor to waist with good form, no inc in pain to manage pet food bags.   (6-: lifted 25# from floor with some strain)     Pt will transfer supine <-->sit and sit <-->stand without pain, using good form.

## 2025-06-17 ENCOUNTER — APPOINTMENT (OUTPATIENT)
Dept: PHYSICAL THERAPY | Facility: CLINIC | Age: 60
End: 2025-06-17
Payer: COMMERCIAL

## 2025-06-17 DIAGNOSIS — M62.81 MUSCLE WEAKNESS: ICD-10-CM

## 2025-06-17 DIAGNOSIS — M54.9 BACK PAIN: Primary | ICD-10-CM

## 2025-06-17 DIAGNOSIS — R26.2 DIFFICULTY WALKING: ICD-10-CM

## 2025-06-19 ENCOUNTER — TREATMENT (OUTPATIENT)
Dept: PHYSICAL THERAPY | Facility: CLINIC | Age: 60
End: 2025-06-19
Payer: COMMERCIAL

## 2025-06-19 DIAGNOSIS — M62.81 MUSCLE WEAKNESS: ICD-10-CM

## 2025-06-19 DIAGNOSIS — M54.2 NECK PAIN: ICD-10-CM

## 2025-06-19 DIAGNOSIS — R26.2 DIFFICULTY WALKING: ICD-10-CM

## 2025-06-19 DIAGNOSIS — M54.9 BACK PAIN: Primary | ICD-10-CM

## 2025-06-19 PROCEDURE — 97110 THERAPEUTIC EXERCISES: CPT | Mod: GP,CQ

## 2025-06-19 PROCEDURE — 97140 MANUAL THERAPY 1/> REGIONS: CPT | Mod: GP,CQ

## 2025-06-19 NOTE — PROGRESS NOTES
Patient Name: Jennifer Juan  MRN: [id number]  Today's Date: 6/19/2025  Time Calculation  Start Time: 1305  Stop Time: 1345  Time Calculation (min): 40 min  Med [place]  25% coins, $500 / $1000 ded, $2500 / $5000 oop, no copay, 20v mary yr (0v used as of 3/24/2025), no [place]  28405 - 20561 not covered.  Visit 7 of 20 (re-assess back at 10, neck at 16)  Subjective:  States that she feels cervical restrictions with rotation . Expressed that she has been trying to more aware of proper posture with seated at computer. LB symptoms continue to limit her daily. Consul with orthopedic in August .    Objective:  Kyphotic posture     Assessment:   Cervical/thoracic restrictions noted with MTT . Responded well with decreased tissue tightness post .  Decreased scapular strength noted. Decreased R scapular retraction with prone cap set .Cues for improved recruitment which did improve reps progressed     Plan:   Progress cervical and scapular strengthening as able     Treatment :   MANUAL TECHNIQUES  79430 30 minutes  2 units   STM along cervical base /Suboccipitals/ UT along B clavicles   SOR /Gentle cervical manual traction /passive cervical ROM /cervical paraspinals/along scap border   MFR across thoracic spine in prone    THERAPEUTIC EXERCISE 36345  10 minutes 1 unit   Supine  DNF   Prone scap retraction   Current Problem:  1. Back pain  Follow Up In Physical Therapy      2. Difficulty walking        3. Muscle weakness

## 2025-06-25 ENCOUNTER — TREATMENT (OUTPATIENT)
Dept: PHYSICAL THERAPY | Facility: CLINIC | Age: 60
End: 2025-06-25
Payer: COMMERCIAL

## 2025-06-25 DIAGNOSIS — R26.2 DIFFICULTY WALKING: ICD-10-CM

## 2025-06-25 DIAGNOSIS — M54.9 BACK PAIN: Primary | ICD-10-CM

## 2025-06-25 DIAGNOSIS — M62.81 MUSCLE WEAKNESS: ICD-10-CM

## 2025-06-25 PROCEDURE — 97113 AQUATIC THERAPY/EXERCISES: CPT | Mod: GP,CQ

## 2025-06-25 NOTE — PROGRESS NOTES
"Patient Name: Jennifer Juan  MRN: [id number]  Today's Date: 6/25/2025  Time Calculation  Start Time: 1013  Stop Time: 1052  Time Calculation (min): 39 min  Med [place]  25% coins, $500 / $1000 ded, $2500 / $5000 oop, no copay, 20v mary yr (0v used as of 3/24/2025), no [place]  20560 - 20561 not covered.  Visit 8 of 20 (re-assess back at 10, neck at 16)  Subjective:  Reports that she continued to have LB pain that is located across entire LB into B hips/glutes. Pain intensity can increased to 5/10 . \"Always have pain  \" Consult with MD in August . Reports that she feels that pool exercises due provide and environment where she can exercise without increasing overall pain.     Objective:  Standing posture anterior pelvic tilt    Assessment:   Pt has made progress with improved trunk stabilization with performance of hip strengthening exercises improved control noted able to now completed without trunk compensatory movements . Progressed and introduced pelvic rocks to improved lumbosacral mobility.  Cues for technique.     Plan:   Cont with improving hip/trunk strength and stabilization    Treatment :   AQUATIC THERAPEUTIC EXERCISE 18677 39 minutes 3 units      Aquatic gait forward/Bwd/Lateral March x3 laps   HS/GS Stretches 2x30    Horacio /Piriformis 2x30   Wall plank with push up  with cervical stabilization at ladder x15 held   Hip 3 way 2x10  Hip circles x10 each   Squats x15   DLS 3 way  level1 wall support x10 each  held   Trunk Rotation with noodle x10  held   Noodle Pull down x15  held   Rows x10   SKTC 1x30   DKTC 1x30 each    Hip circles   Post tilts         Current Problem:  1. Back pain  Follow Up In Physical Therapy      2. Difficulty walking        3. Muscle weakness                Pain:  5/10    Location: LB          "

## 2025-06-27 ENCOUNTER — APPOINTMENT (OUTPATIENT)
Dept: PHYSICAL THERAPY | Facility: CLINIC | Age: 60
End: 2025-06-27
Payer: COMMERCIAL

## 2025-06-27 DIAGNOSIS — M54.9 BACK PAIN: Primary | ICD-10-CM

## 2025-06-27 DIAGNOSIS — R26.2 DIFFICULTY WALKING: ICD-10-CM

## 2025-06-27 DIAGNOSIS — M62.81 MUSCLE WEAKNESS: ICD-10-CM

## 2025-06-30 ENCOUNTER — APPOINTMENT (OUTPATIENT)
Dept: PHYSICAL THERAPY | Facility: CLINIC | Age: 60
End: 2025-06-30
Payer: COMMERCIAL

## 2025-06-30 DIAGNOSIS — M62.81 MUSCLE WEAKNESS: ICD-10-CM

## 2025-06-30 DIAGNOSIS — M54.9 BACK PAIN: Primary | ICD-10-CM

## 2025-06-30 DIAGNOSIS — R26.2 DIFFICULTY WALKING: ICD-10-CM

## 2025-07-02 ENCOUNTER — APPOINTMENT (OUTPATIENT)
Dept: PHYSICAL THERAPY | Facility: CLINIC | Age: 60
End: 2025-07-02
Payer: COMMERCIAL

## 2025-07-02 DIAGNOSIS — R26.2 DIFFICULTY WALKING: ICD-10-CM

## 2025-07-02 DIAGNOSIS — M62.81 MUSCLE WEAKNESS: ICD-10-CM

## 2025-07-02 DIAGNOSIS — M54.9 BACK PAIN: Primary | ICD-10-CM

## 2025-07-11 ENCOUNTER — TREATMENT (OUTPATIENT)
Dept: PHYSICAL THERAPY | Facility: CLINIC | Age: 60
End: 2025-07-11
Payer: COMMERCIAL

## 2025-07-11 DIAGNOSIS — M62.81 MUSCLE WEAKNESS: ICD-10-CM

## 2025-07-11 DIAGNOSIS — R26.2 DIFFICULTY WALKING: ICD-10-CM

## 2025-07-11 DIAGNOSIS — M54.9 BACK PAIN: Primary | ICD-10-CM

## 2025-07-11 DIAGNOSIS — M54.2 NECK PAIN: ICD-10-CM

## 2025-07-11 PROCEDURE — 97110 THERAPEUTIC EXERCISES: CPT | Mod: GP | Performed by: PHYSICAL THERAPIST

## 2025-07-11 PROCEDURE — 97140 MANUAL THERAPY 1/> REGIONS: CPT | Mod: GP | Performed by: PHYSICAL THERAPIST

## 2025-07-11 NOTE — PROGRESS NOTES
Physical Therapy    Physical Therapy Treatment/Re-check    Patient Name: Jennifer Juan  MRN: [id number]  Today's Date: 7/11/2025  Insurance:  Med [place]  25% coins, $500 / $1000 ded, $2500 / $5000 oop, no copay, 20v mary yr (0v used as of 3/24/2025), no [place]  61614 - 67947 not covered.  Visit 13 of 20   Time Entry:                           Current Problem  1. Back pain        2. Difficulty walking        3. Muscle weakness          Subjective    General   Pt reports that she has small improvement in lumbopelvic pain since doing pool PT. LBP = 3-4/10, but she admits she hasn't done much today to irritate it.  Neck pain= 2-3/10, but stiff.     Precautions     Pain       Objective   CROM:   Flex:  Ext:  R/L lat flex:  R/L rotation:    UE strength R/L:  Shoulder flex:  Shoulder ext:  Shoulder abduct:  Shoulder IR:  Shoulder ER:  Bicep:  Tricep:  Wrist ext:    Posture   Minimal cerv lordosis. Minimal mid and lower thoracic kyphosis.     Outcome Measures:       Treatments:  Ther Ex 26304: 15/1  *updated goals*    Cerv isometrics: flex, ext, lat flex; 5x each  Seated trunk flexion stretch, :05 5x    Manual Ther Tech 25724: 23/2  TPR to L cluneal nerve area, L glutes, R lateral sacral border.  Shotgun   SOR, light manual cerv traction    OP EDUCATION:       Assessment:  Pt cont to experience neck and back pain to significant degrees that limits her daily functional tolerances. She cont to have limited CROM and lumbar ROM, yamileth today given the recent exacerbation of her lumbar issues. She would benefit from add'l skilled PT to cont to manage these symptoms, and cont to improve postural and core support so that she is better able indep manage symptoms.    Plan:   Cont 1x/week for 10 visits to address spinal and lumbopelvic instability.    Goals:  Goals to be achieved by discharge, approx  12 weeks.  Patient will verbalize pain (0-10) = 0/10 at rest and 3/10 at worst with activity.  (6-: neck 2-5/10, back 4-6/10)     Patient  will correctly perform home exercise program to progress current functional status.  (6-: HEP daily, back HEP is every morning)     Modified Oswestry rating score < 20 % to demonstrate overall improved functional abilities.     Increase lumbar ROM: lat flex= 20*     Increase LE strength: hip flex= 30#, abduct= 30#, adduct= 25#, quads= 40#, hams= 30#     Increase walking gregorio to 45 min without signif inc in pain to improve community amb. And shopping.  (6-: walking gregorio= 30 min)     Pt will manage lifting 25# from floor to waist with good form, no inc in pain to manage pet food bags.   (6-: lifted 25# from floor with some strain)     Pt will transfer supine <-->sit and sit <-->stand without pain, using good form.    (6-: pt able to transfer with decent form, though has pain often)    Patient will verbalize pain (0-10) = 0/10 at rest and 4/10 at worst with activity.  (7-:  2-5/10)    Neck Disability Index score < 25 % to demonstrate overall improved functional abilities.  (7-:     Increase CROM: flex= 45*, ext= 45*, lat flex= 30*, rotat= 55*.  (7-:     Pt will maintain upright seated posture when unaware of observation, and be able to self-correct when faulting, and demo good working knowledge of proper postural support.   (7-:     Increase UE strength: shoulder flex= 16#, ext= 25#, abduct= 16#, IR= 20#, ER= 15#, bicep= 25#, tricep= 25#, wrist ext= 20#.  (See obj section)

## 2025-07-18 ENCOUNTER — APPOINTMENT (OUTPATIENT)
Dept: PHYSICAL THERAPY | Facility: CLINIC | Age: 60
End: 2025-07-18
Payer: COMMERCIAL

## 2025-07-18 DIAGNOSIS — M54.9 BACK PAIN: Primary | ICD-10-CM

## 2025-07-18 DIAGNOSIS — R26.2 DIFFICULTY WALKING: ICD-10-CM

## 2025-07-18 DIAGNOSIS — M62.81 MUSCLE WEAKNESS: ICD-10-CM

## 2025-07-23 ENCOUNTER — APPOINTMENT (OUTPATIENT)
Dept: ORTHOPEDIC SURGERY | Facility: CLINIC | Age: 60
End: 2025-07-23
Payer: COMMERCIAL

## 2025-07-23 NOTE — PROGRESS NOTES
New Consult/New Patient Note    7/23/2025   Silvia Li MD    Assessment:      PLAN:  1)  Imaging/Diagnostic Studies: [ ]   2)  Therapy/Rehabilitation: [ ]   3)  Pharmacological Management: [ ]   4)  Spine/Surgical Interventions: [ ]   5)  Alternative Treatments: May consider alternative treatment options in the future including manipulation (chiropractor versus osteopathic) and/or acupuncture if patient does not obtain optimal relief with initial treatment plan.  6)  Consultations:  None at this time  7)  Follow -up: 4-6 weeks or PRN if symptoms worsen/do not improve.   8)  Future treatment considerations: [ ]     Patient advised of the difference between hurt and harm and advised to continue with all normal activities and exercises. Patient verbalized understanding of the above plan and was happy with the care provided.      The above clinical summary has been dictated with voice recognition software. It has not been proofread for grammatical errors, typographical mistakes, or other semantic inconsistencies.    Thank you for visiting our office today. It was our pleasure to take part in your healthcare.     Do not hesitate to call with any questions regarding your plan of care after leaving at [phone]    To clinicians, thank you very much for this kind referral. It is a privilege to partner with you in the care of your patients. My office would be delighted to assist you with any further consultations or with questions regarding the plan of care outlined. Do not hesitate to call the office or contact me directly.     Sincerely,    MARCEL Durham MD  , Physical Medicine and Rehabilitation, Orthopedic Spine  [facility] [employer] [facility]  [facility]         Jennifer Juan   is a 59 y.o. female who presents with   Location:  Radiation:  Quality:       current /10,  at its worst  /10  Exacerbated by  Relieved by  Onset, traumatic event:    Has tried:              Valsalva sign is [ ]   Grocery cart sign is [ ]   Smoker:  [ ]   Does not wake them at night  Litigation: [ ]     Patient denies bowel/bladder incontinence, denies fever, denies unintentional weight loss, denies clumsiness of hands, feet, or dropping things.  Denies any constitutional or myelopathic symptomatology.      PREVIOUS TREATMENTS  IN THE LAST SIX MONTHS     Active conservative therapy  in the last six months (see below)              1. Physical therapy:                                                                                     2. Home exercise program after PT:                                                      3. A physician supervised home exercise program (HEP):                 4. Chiropractic Care:                                                                      Passive conservative therapy  in the last six months (see below)              1. NSAIDS:                                                                                                           2. Prescription pain medication:                                                              3. Acupuncture:                                                                                             4. Tens unit:      Assistive Devices:     Work status: [ ]       ROS: Other than listed in HPI, PMHX below, and intake paperwork including a 30 point patient-recorded review of symptoms which was personally reviewed and inclusive of no history of unintentional weight loss, change in appetite, significant malaise, fevers, chills, or change in bowel/bladder, shortness of breath, or chest pain.    I have confirmed and edited as necessary Past Medical, Past Surgical, Family, Social History and ROS as obtained by others. These were also obtained on new patient forms.      PHYSICAL EXAM:   GENERAL APPEARANCE:  Well nourished, well developed, and no apparent distress.  NEURO PSYCH: Patient oriented to person, place, Mood  pleasant. Benign affect.  MUSCULOSKELETAL and NEUROLOGICAL       VISUAL INSPECTION          CERVICAL: WNL          THORACIC: WNL           LUMBAR: WNL  SPINE ROM:   CERVICAL ROM: [ ]   LUMBAR ROM: [ ]       PALPATION:           SPINOUS PROCESS: [ ]            PARASPINALS: [ ]   FACET LOADING: [ ]   MUSCLE BULK: Normal and symmetrical in the upper & lower extremities.  MUSCLE TONE: Normal  MOTOR: 5/5 in all muscle groups tested in bilateral upper and lower extremities   SENSORY: Normal sensory exam to light touch  GAIT: Normal.  Able to go up and heels and toes with no sig. weakness.  No sig. balance deficit appreciated  REFLEXES: +2 to bilateral U/L extremities  LONG TRACT SIGNS: No clonus, Neg Hoffmans.  STRAIGHT LEG TEST: [ ]   PERIPHERAL JOINT ROM:   HIP ROM: Full bilaterally  POOJA/Thigh Thrust/Compression/Nadeen Finger:  Negative bilaterally   Hip Exam including thigh thrust and LOG ROLL: Negative bilaterally  SHOULDER ROM: Full bilaterally   SPURLING'S TEST: Negative  BAKODY'S SIGN:  No sig. pain with overhead activity    DATA REVIEW:   The below imaging studies were personally reviewed and discussed with the patient.    Medical Decision Making:  The above note constitutes a Moderate to High level of medical decision making based on past data and imaging review, new and chronic symptoms with exacerbation, change in weakness or sensation, new imaging and diagnostic studies ordered, discussion of potential interventional or surgical treatment options, acute or chronic pain that may pose a threat to bodily function.    Medical History[1]    Medication Documentation Review Audit       Reviewed by Zoë Mirza MA (Medical Assistant) on 05/15/25 at 1520      Medication Order Taking? Sig Documenting Provider Last Dose Status   ALPRAZolam (Xanax) 0.25 mg tablet [id number] Yes TAKE 1 TABLET 3 TIMES DAILY AFTER MEALS AS NEEDED Historical Provider, MD Taking Active   baclofen (Lioresal) 10 mg tablet [id number] Yes Take 1  tablet (10 mg) by mouth 2 times a day. Historical Provider, MD Taking Active   Caplyta 21 mg capsule [id number] Yes Take 1 capsule (21 mg) by mouth. AT BEDTIME Historical Provider, MD  Active   chlordiazepoxide-clidinium (Librax) 5-2.5 mg capsule [id number] No Take 1 capsule by mouth 3 times a day as needed.   Patient not taking: Reported on 5/15/2025    Historical MD Rene Taking Active   chlorhexidine (Peridex) 0.12 % solution [id number] No RINSE with 1/2 ounce(s) twice a day for 30 SECONDS   Patient not taking: Reported on 5/15/2025    Historical MD Rene Taking Active   cholecalciferol (Vitamin D-3) 25 MCG (1000 UT) capsule [id number] Yes Take 1 capsule (25 mcg) by mouth once daily. Historical Provider, MD Taking Active   dicyclomine (Bentyl) 20 mg tablet [id number] No Take 1 tablet (20 mg) by mouth 4 times a day before meals.   Patient not taking: Reported on 5/15/2025    Historical MD Rene Taking Active   esomeprazole (NexIUM) 20 mg DR capsule [id number] Yes Take 1 capsule (20 mg) by mouth once daily in the morning. Take before meals. Historical Provider, MD Taking Active   esomeprazole (NexIUM) 40 mg DR capsule [id number]  Take 1 capsule (40 mg) by mouth every 12 hours.   Patient not taking: Reported on 5/15/2025    Historical Provider, MD  Active   fluticasone (Flonase) 50 mcg/actuation nasal spray [id number] Yes Administer into affected nostril(s). Inhale 1-2 sprays in each nostril daily. Historical Provider, MD Taking Active   gabapentin (Neurontin) 600 mg tablet [id number] Yes Take 2 tablets (1,200 mg) by mouth 3 times a day. Zoë Cardenas, APRN-CNP  Active   ibuprofen 600 mg tablet [id number] Yes  Historical Provider, MD Taking Active     Discontinued 05/09/25 1443   lidocaine (Xylocaine) 5 % ointment [id number] Yes Apply topically if needed for mild pain (1 - 3). Apply a thin layer to area as directed 2 times per day as needed Silvia Li MD  Active   methylPREDNISolone (Medrol Dospak) 4 mg  tablets [id number]  Take as directed on package.   Patient not taking: Reported on 5/15/2025    Silvia Li MD  Active   mometasone (Elocon) 0.1 % cream [id number]  Apply topically once daily for 21 days. Silvia Li MD   07/15/24 235    Patient not taking:   Discontinued 25 1458   omeprazole (PriLOSEC) 40 mg DR capsule [id number] No    Patient not taking: Reported on 5/15/2025    Historical Provider, MD Taking Active   potassium chloride CR (Klor-Con M20) 20 mEq ER tablet [id number] No Do not crush or chew.  Take 1 tablet daily for 4 days only.   Patient not taking: Reported on 5/15/2025    Silvia Li MD Taking Active    Patient not taking:   Discontinued 25 1458   tiZANidine (Zanaflex) 2 mg capsule [id number]  Take 1 capsule (2 mg) by mouth once daily at bedtime. Ghada Smith, APRN-CNP   25 235   traMADol (Ultram) 50 mg tablet [id number]  Take 1 tablet (50 mg) by mouth every 6 hours if needed for severe pain (7 - 10) for up to 5 days. Siliva Li MD   25 235   tretinoin (Retin-A) 0.05 % cream [id number] Yes apply pea size amount to face at bedtime Historical Provider, MD  Active   triamcinolone (Kenalog) 0.1 % oral paste [id number]     Patient not taking: Reported on 5/15/2025    Historical MD Rene  Active   valACYclovir (Valtrex) 500 mg tablet [id number] Yes Take 1 tablet (500 mg) by mouth once daily. Historical Provider, MD Taking Active   vilazodone (Viibryd) 40 mg tablet [id number] Yes Take 1 tablet (40 mg) by mouth once daily with breakfast. Historical Provider, MD Taking Active   zolpidem CR (Ambien CR) 12.5 mg ER tablet [id number] Yes Take 1 tablet (12.5 mg) by mouth as needed at bedtime. Historical Provider, MD Taking Active                    RX Allergies[2]    Social History     Socioeconomic History    Marital status: Single     Spouse name: Not on file    Number of children: 0    Years of education: Not on file    Highest education level:  Not on file   Occupational History    Not on file   Tobacco Use    Smoking status: Never    Smokeless tobacco: Never   Vaping Use    Vaping status: Never Used   Substance and Sexual Activity    Alcohol use: Yes    Drug use: Never    Sexual activity: Not on file   Other Topics Concern    Not on file   Social History Narrative    Not on file     Social Drivers of Health     Financial Resource Strain: Not on file   Food Insecurity: Not on file   Transportation Needs: Not on file   Physical Activity: Not on file   Stress: Not on file   Social Connections: Not on file   Intimate Partner Violence: Not on file   Housing Stability: Not on file       Surgical History[3]         [1]   Past Medical History:  Diagnosis Date    Personal history of other diseases of the digestive system     History of esophageal reflux    Personal history of other diseases of the musculoskeletal system and connective tissue     History of backache    Personal history of other diseases of the musculoskeletal system and connective tissue 01/22/2013    History of backache    Personal history of other diseases of the respiratory system 12/08/2021    History of sinusitis   [2] No Known Allergies  [3]   Past Surgical History:  Procedure Laterality Date    BREAST BIOPSY Right 2012    Benign core biopsu    MR HEAD ANGIO WO IV CONTRAST  11/12/2020    MR HEAD ANGIO WO IV CONTRAST 11/12/2020 [facility]    OTHER SURGICAL HISTORY  09/25/2020    No history of surgery

## 2025-07-25 ENCOUNTER — TREATMENT (OUTPATIENT)
Dept: PHYSICAL THERAPY | Facility: CLINIC | Age: 60
End: 2025-07-25
Payer: COMMERCIAL

## 2025-07-25 DIAGNOSIS — R26.2 DIFFICULTY WALKING: ICD-10-CM

## 2025-07-25 DIAGNOSIS — M54.2 NECK PAIN: ICD-10-CM

## 2025-07-25 DIAGNOSIS — M62.81 MUSCLE WEAKNESS: ICD-10-CM

## 2025-07-25 DIAGNOSIS — M54.9 BACK PAIN: Primary | ICD-10-CM

## 2025-07-25 PROCEDURE — 97110 THERAPEUTIC EXERCISES: CPT | Mod: GP | Performed by: PHYSICAL THERAPIST

## 2025-07-25 PROCEDURE — 97140 MANUAL THERAPY 1/> REGIONS: CPT | Mod: GP | Performed by: PHYSICAL THERAPIST

## 2025-07-25 NOTE — PROGRESS NOTES
Physical Therapy    Physical Therapy Treatment    Patient Name: Jennifer Juan  MRN: [id number]  Today's Date: 7/26/2025  Insurance:  [employer]  25% coins, $500 / $1000 ded, $2500 / $5000 oop, no copay, 20v mary yr (0v used as of 3/24/2025), no [place]  20560 - 20561 not covered.  Visit 14 of 20   Time Entry:   Time Calculation  Start Time: 1130  Stop Time: 1238  Time Calculation (min): 68 min     PT Therapeutic Procedures Time Entry  Therapeutic Exercise Time Entry: 9  Manual Therapy Time Entry: 44  Needle Insertion w/o Injection 3+ Muscles: 7                 Current Problem  1. Back pain  Follow Up In Physical Therapy      2. Difficulty walking        3. Muscle weakness        4. Neck pain  Follow Up In Physical Therapy        Subjective    General  Pt cont to c/o lumbosacral pain L>R, with burning pain radiating down the LLE to the heel area. Currently it is down the lateral L thigh to the knee.  Pt reports that she was trying to write earlier today, and states that it seems that her hand and wrist area became painful. She noted inc UT tension after last visit.    Precautions  Precautions  Precautions Comment: no falls since last visit  Pain       Objective   (+) Tinels sign at R carpal tunnel    Posture     Treatments:  Ther Ex 28595: 9/1  Supine deep neck flexion, 10x  Cerv isometrics: flex, ext, lat flex; 5x each  Cerv retractions, 10x    Not performed today:  Seated trunk flexion stretch, :05 5x    Manual Ther Tech 70140: 44/3  TPR to flexor muscle belly on R.  R median nerve glide, 10x  SOR, light manual cerv traction  TPR to L cluneal nerve area, L glutes, L piriformis  DTM to same.    Dry Needling 20561: 7/0  Pair of 50mm needles placed at trans process of L5  40mm needle placed at L lat pop nerve area  40mm needle placed at L sural nerve area.  No manip performed  Pt was edu'd on the process of dry needling, as well as how to monitor for light bruising. Pt was also edu'd to maintain good hydration and move as  normally as possible, avoiding pain when needed.  Verbal consent obtained for DN procedure after explaining the process.      OP EDUCATION:       Assessment:  Pt tolerated needling well, with increased sensitivity in expected nerve path areas. Sural nerve area was most sensitive and unable to tolerate full needle depth. She was able to walk decently after needling, but there was some visible weakness in lateral hip on L>R, which is consistent with sustained nerve impingement of L5-S1 nerve roots.     Plan:   Cont to progress as gregorio with postural and core strengthening.  Follow up with pt re: longer effects of needling.   Goals:  See 7- note for goal updates.

## 2025-07-30 ENCOUNTER — APPOINTMENT (OUTPATIENT)
Dept: PHYSICAL THERAPY | Facility: CLINIC | Age: 60
End: 2025-07-30
Payer: COMMERCIAL

## 2025-07-30 DIAGNOSIS — M54.9 BACK PAIN: Primary | ICD-10-CM

## 2025-07-30 DIAGNOSIS — R26.2 DIFFICULTY WALKING: ICD-10-CM

## 2025-07-30 DIAGNOSIS — M62.81 MUSCLE WEAKNESS: ICD-10-CM

## 2025-08-01 ENCOUNTER — TREATMENT (OUTPATIENT)
Dept: PHYSICAL THERAPY | Facility: CLINIC | Age: 60
End: 2025-08-01
Payer: COMMERCIAL

## 2025-08-01 DIAGNOSIS — M54.9 BACK PAIN: Primary | ICD-10-CM

## 2025-08-01 DIAGNOSIS — M54.2 NECK PAIN: ICD-10-CM

## 2025-08-01 DIAGNOSIS — R26.2 DIFFICULTY WALKING: ICD-10-CM

## 2025-08-01 DIAGNOSIS — M62.81 MUSCLE WEAKNESS: ICD-10-CM

## 2025-08-01 PROCEDURE — 97140 MANUAL THERAPY 1/> REGIONS: CPT | Mod: GP | Performed by: PHYSICAL THERAPIST

## 2025-08-01 PROCEDURE — 97110 THERAPEUTIC EXERCISES: CPT | Mod: GP | Performed by: PHYSICAL THERAPIST

## 2025-08-01 NOTE — PROGRESS NOTES
Physical Therapy    Physical Therapy Treatment    Patient Name: Jennifer Juan  MRN: [id number]  Today's Date: 8/7/2025  Insurance:  [employer]  25% coins, $500 / $1000 ded, $2500 / $5000 oop, no copay, 20v mary yr (0v used as of 3/24/2025), no PA  [address]594 - 20561 not covered.  Visit 15 of 20   Time Entry:   Time Calculation  Start Time: 1100  Stop Time: 1142  Time Calculation (min): 42 min     PT Therapeutic Procedures Time Entry  Therapeutic Exercise Time Entry: 18  Manual Therapy Time Entry: 23                 Current Problem  1. Back pain  Follow Up In Physical Therapy      2. Difficulty walking        3. Muscle weakness        4. Neck pain  Follow Up In Physical Therapy        Subjective    General  Pt cont to c/o pain with neck and back, particularly in the R low back/SI joint area.    Precautions     Pain       Objective   (+) Tinels sign at R carpal tunnel    Posture     Treatments:  Ther Ex 87092: 18/1  Supine deep neck flexion, 10x  Cerv isometrics: flex, ext, lat flex; 5x each  Cerv retractions, 10x  Tband B shoulder ER, red, 10x  Flat supine PPT 10x  R wrist flexor stretch, :30 2x    Not performed today:  Seated trunk flexion stretch, :05 5x    Manual Ther Tech 10421: 23/2  TPR to RUE flexor muscle belly.  R median nerve glide, 10x  Passive stretching of RUE flexor muscles.    TPR to B psoas and R iliacus    SOR, light manual cerv traction      Dry Needling 20561: ---  Not performed today:    Pair of 50mm needles placed at trans process of L5  40mm needle placed at L lat pop nerve area  40mm needle placed at L sural nerve area.  No manip performed  Pt was edu'd on the process of dry needling, as well as how to monitor for light bruising. Pt was also edu'd to maintain good hydration and move as normally as possible, avoiding pain when needed.  Verbal consent obtained for DN procedure after explaining the process.      OP EDUCATION:       Assessment:  Pt's newer R carpal tunnel symptoms are likely related to  double crush. There is already a baseline of nerve impingement at the neck, and any increased impingement along the path will likely elicit symptoms. For this pt, it was likely the way she was using and holding her cell phone yesterday for an extended amount of time. She was edu'd on how to prevent the distal impingement as well as address flexor tendon inflammation. Add'l work to stabilize nerve impingement sites is recommended.    Plan:   Cont to progress as gregorio with postural and core strengthening.    Goals:  See 7- note for goal updates.

## 2025-08-06 NOTE — PROGRESS NOTES
"Subjective   Patient ID: Jennifer Juan is a 59 y.o. female who presents for Wrist Pain (Pt is here due to right and left wrist pain as  well neck pain  ,  pt ./Pt did stated that she gets stiffness , and had recently dry needling treatment ).    HPI   Patient complains of neck pain. Event that precipitated these symptoms: Hx of neck problems with disc issues. Onset of symptoms 1 weeks ago, and have been gradually worsening since that time. Current symptoms are pain in right wrist and neck with stiffness (aching in character; 5/10 in severity), with numbness and tingling to right pinky, and ring finger. Patient denies weakness in right arm. Patient has had previous osteoarthritis of cervical spine. Previous treatments include: physical therapy where she tried dry needling. Has not been taking prescribed baclofen or zanaflex.   She is starting a new job next week. Anxious about starting.         Review of Systems   Musculoskeletal:  Positive for arthralgias, back pain, neck pain and neck stiffness. Negative for gait problem, joint swelling and myalgias.       Objective   /80 (BP Location: Left arm, Patient Position: Sitting, BP Cuff Size: Adult)   Pulse 79   Temp 36.7 °C (98 °F) (Temporal)   Resp 16   Ht 1.702 m (5' 7\")   Wt 81.3 kg (179 lb 3.2 oz)   SpO2 97%   BMI 28.07 kg/m²     Physical Exam  Vitals reviewed.   Constitutional:       Appearance: Normal appearance. She is normal weight.     Eyes:      Conjunctiva/sclera: Conjunctivae normal.       Cardiovascular:      Rate and Rhythm: Normal rate and regular rhythm.      Heart sounds: Normal heart sounds.   Pulmonary:      Effort: Pulmonary effort is normal.      Breath sounds: Normal breath sounds.     Musculoskeletal:         General: Normal range of motion.      Cervical back: Neck supple. Swelling and tenderness (paraspinal) present. No deformity, erythema or bony tenderness. Pain with movement (general ROM) present.     Skin:     General: " Skin is warm and dry.     Neurological:      General: No focal deficit present.      Mental Status: She is alert and oriented to person, place, and time.      Sensory: Sensation is intact.      Motor: Motor function is intact.      Coordination: Coordination is intact.      Gait: Gait is intact.     Psychiatric:         Mood and Affect: Mood normal.         Assessment/Plan   Assessment & Plan  Myofascial pain    Orders:    lidocaine (Xylocaine) 5 % ointment; Apply topically if needed for mild pain (1 - 3). Apply a thin layer to area as directed 2 times per day as needed    Screening mammogram for breast cancer    Orders:    BI mammo bilateral screening tomosynthesis; Future    -No symptoms currently.   -Patient requesting order for screening.   -Patient instructed to schedule PE with Dr. Li.   Cervical radiculitis    Orders:    lidocaine (Xylocaine) 5 % ointment; Apply topically if needed for mild pain (1 - 3). Apply a thin layer to area as directed 2 times per day as needed    ibuprofen 600 mg tablet; Take 1 tablet (600 mg) by mouth 3 times a day as needed for moderate pain (4 - 6) for up to 14 days.    -Symptoms likely muscular in nature from cervical radiculopathy.   -Zanaflex at bedtime. Recommend against taking baclofen with it for now.   -Encouraged use of heating pad 3-4 times per day no more than 20 mins per session.   -Continue PT.  -Refilled Ibuprofen for as needed pain. Take with food.   -Patient requested topical lidocaine, as this has helped in the past.   -Has follow up appt with Dr. Westbrook next week. Patient made appt with Dr. Capps for wrist pain.   -Call or return to office prn if these symptoms worsen or fail to improve as anticipated.  -Follow up with Dr. Li as scheduled, sooner if symptoms persist or worsen.

## 2025-08-07 ENCOUNTER — OFFICE VISIT (OUTPATIENT)
Dept: PRIMARY CARE | Facility: CLINIC | Age: 60
End: 2025-08-07
Payer: COMMERCIAL

## 2025-08-07 VITALS
BODY MASS INDEX: 28.12 KG/M2 | WEIGHT: 179.2 LBS | TEMPERATURE: 98 F | DIASTOLIC BLOOD PRESSURE: 80 MMHG | HEIGHT: 67 IN | OXYGEN SATURATION: 97 % | RESPIRATION RATE: 16 BRPM | SYSTOLIC BLOOD PRESSURE: 120 MMHG | HEART RATE: 79 BPM

## 2025-08-07 DIAGNOSIS — M79.18 MYOFASCIAL PAIN: ICD-10-CM

## 2025-08-07 DIAGNOSIS — Z12.31 SCREENING MAMMOGRAM FOR BREAST CANCER: Primary | ICD-10-CM

## 2025-08-07 DIAGNOSIS — M54.12 CERVICAL RADICULITIS: ICD-10-CM

## 2025-08-07 PROCEDURE — 1036F TOBACCO NON-USER: CPT | Performed by: NURSE PRACTITIONER

## 2025-08-07 PROCEDURE — 3008F BODY MASS INDEX DOCD: CPT | Performed by: NURSE PRACTITIONER

## 2025-08-07 PROCEDURE — 99214 OFFICE O/P EST MOD 30 MIN: CPT | Performed by: NURSE PRACTITIONER

## 2025-08-07 RX ORDER — LIDOCAINE 50 MG/G
OINTMENT TOPICAL AS NEEDED
Qty: 50 G | Refills: 3 | Status: SHIPPED | OUTPATIENT
Start: 2025-08-07

## 2025-08-07 RX ORDER — IBUPROFEN 600 MG/1
600 TABLET, FILM COATED ORAL 3 TIMES DAILY PRN
Qty: 42 TABLET | Refills: 0 | Status: SHIPPED | OUTPATIENT
Start: 2025-08-07 | End: 2025-08-21

## 2025-08-07 ASSESSMENT — PATIENT HEALTH QUESTIONNAIRE - PHQ9
2. FEELING DOWN, DEPRESSED OR HOPELESS: NOT AT ALL
SUM OF ALL RESPONSES TO PHQ9 QUESTIONS 1 AND 2: 0
1. LITTLE INTEREST OR PLEASURE IN DOING THINGS: NOT AT ALL

## 2025-08-07 ASSESSMENT — ENCOUNTER SYMPTOMS
NECK STIFFNESS: 1
BACK PAIN: 1
ARTHRALGIAS: 1
MYALGIAS: 0
NECK PAIN: 1
JOINT SWELLING: 0

## 2025-08-07 NOTE — ASSESSMENT & PLAN NOTE
Orders:    lidocaine (Xylocaine) 5 % ointment; Apply topically if needed for mild pain (1 - 3). Apply a thin layer to area as directed 2 times per day as needed

## 2025-08-07 NOTE — ASSESSMENT & PLAN NOTE
Orders:    lidocaine (Xylocaine) 5 % ointment; Apply topically if needed for mild pain (1 - 3). Apply a thin layer to area as directed 2 times per day as needed    ibuprofen 600 mg tablet; Take 1 tablet (600 mg) by mouth 3 times a day as needed for moderate pain (4 - 6) for up to 14 days.

## 2025-08-08 ENCOUNTER — TREATMENT (OUTPATIENT)
Dept: PHYSICAL THERAPY | Facility: CLINIC | Age: 60
End: 2025-08-08
Payer: COMMERCIAL

## 2025-08-08 DIAGNOSIS — M62.81 MUSCLE WEAKNESS: ICD-10-CM

## 2025-08-08 DIAGNOSIS — R26.2 DIFFICULTY WALKING: ICD-10-CM

## 2025-08-08 DIAGNOSIS — M54.9 BACK PAIN: Primary | ICD-10-CM

## 2025-08-08 PROCEDURE — 97140 MANUAL THERAPY 1/> REGIONS: CPT | Mod: GP | Performed by: PHYSICAL THERAPIST

## 2025-08-08 PROCEDURE — 97110 THERAPEUTIC EXERCISES: CPT | Mod: GP | Performed by: PHYSICAL THERAPIST

## 2025-08-11 ENCOUNTER — TREATMENT (OUTPATIENT)
Dept: PHYSICAL THERAPY | Facility: CLINIC | Age: 60
End: 2025-08-11
Payer: COMMERCIAL

## 2025-08-11 DIAGNOSIS — R26.2 DIFFICULTY WALKING: ICD-10-CM

## 2025-08-11 DIAGNOSIS — M62.81 MUSCLE WEAKNESS: ICD-10-CM

## 2025-08-11 DIAGNOSIS — M54.9 BACK PAIN: Primary | ICD-10-CM

## 2025-08-11 PROCEDURE — 97113 AQUATIC THERAPY/EXERCISES: CPT | Mod: GP,CQ

## 2025-08-13 ENCOUNTER — HOSPITAL ENCOUNTER (OUTPATIENT)
Dept: RADIOLOGY | Facility: CLINIC | Age: 60
Discharge: HOME | End: 2025-08-13
Payer: COMMERCIAL

## 2025-08-13 ENCOUNTER — OFFICE VISIT (OUTPATIENT)
Dept: ORTHOPEDIC SURGERY | Facility: CLINIC | Age: 60
End: 2025-08-13
Payer: COMMERCIAL

## 2025-08-13 ENCOUNTER — TELEPHONE (OUTPATIENT)
Dept: PRIMARY CARE | Facility: CLINIC | Age: 60
End: 2025-08-13

## 2025-08-13 DIAGNOSIS — M54.2 NECK PAIN: ICD-10-CM

## 2025-08-13 DIAGNOSIS — M54.50 LOW BACK PAIN AT MULTIPLE SITES: ICD-10-CM

## 2025-08-13 DIAGNOSIS — M48.02 CERVICAL STENOSIS OF SPINE: ICD-10-CM

## 2025-08-13 DIAGNOSIS — M54.16 LUMBAR RADICULOPATHY, CHRONIC: ICD-10-CM

## 2025-08-13 DIAGNOSIS — M43.10 ACQUIRED SPONDYLOLISTHESIS: Primary | ICD-10-CM

## 2025-08-13 PROCEDURE — 72110 X-RAY EXAM L-2 SPINE 4/>VWS: CPT | Performed by: RADIOLOGY

## 2025-08-13 PROCEDURE — 99204 OFFICE O/P NEW MOD 45 MIN: CPT | Performed by: PHYSICAL MEDICINE & REHABILITATION

## 2025-08-13 PROCEDURE — 72050 X-RAY EXAM NECK SPINE 4/5VWS: CPT

## 2025-08-13 PROCEDURE — 72110 X-RAY EXAM L-2 SPINE 4/>VWS: CPT

## 2025-08-13 PROCEDURE — 72050 X-RAY EXAM NECK SPINE 4/5VWS: CPT | Performed by: RADIOLOGY

## 2025-08-13 SDOH — SOCIAL STABILITY: SOCIAL NETWORK: SOCIAL ACTIVITY:: 6

## 2025-08-15 PROBLEM — M25.579 PAIN IN JOINT INVOLVING ANKLE AND FOOT: Status: RESOLVED | Noted: 2023-01-16 | Resolved: 2025-08-15

## 2025-08-15 PROBLEM — G56.00 CARPAL TUNNEL SYNDROME: Status: RESOLVED | Noted: 2024-03-29 | Resolved: 2025-08-15
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
## 2025-08-15 PROBLEM — M62.81 MUSCLE WEAKNESS: Status: RESOLVED | Noted: 2023-04-05 | Resolved: 2025-08-15

## 2025-08-15 PROBLEM — R20.0 FACIAL NUMBNESS: Status: RESOLVED | Noted: 2023-04-05 | Resolved: 2025-08-15

## 2025-08-15 PROBLEM — M54.50 LOW BACK PAIN: Status: RESOLVED | Noted: 2023-04-05 | Resolved: 2025-08-15

## 2025-08-15 PROBLEM — R51.9 HEADACHE: Status: RESOLVED | Noted: 2023-04-05 | Resolved: 2025-08-15

## 2025-08-15 PROBLEM — M54.16 LUMBAR RADICULOPATHY: Status: RESOLVED | Noted: 2023-04-05 | Resolved: 2025-08-15

## 2025-08-15 PROBLEM — M25.511 PAIN IN JOINT OF RIGHT SHOULDER: Status: RESOLVED | Noted: 2023-04-05 | Resolved: 2025-08-15

## 2025-08-15 PROBLEM — M79.673 FOOT ARCH PAIN: Status: RESOLVED | Noted: 2022-09-16 | Resolved: 2025-08-15

## 2025-08-15 PROBLEM — M79.645 BILATERAL THUMB PAIN: Status: RESOLVED | Noted: 2023-04-05 | Resolved: 2025-08-15

## 2025-08-15 PROBLEM — M47.812 CERVICAL FACET SYNDROME: Status: RESOLVED | Noted: 2023-04-05 | Resolved: 2025-08-15

## 2025-08-15 PROBLEM — R20.8 BURNING SENSATION: Status: RESOLVED | Noted: 2023-04-05 | Resolved: 2025-08-15

## 2025-08-15 PROBLEM — M76.30 ILIOTIBIAL BAND SYNDROME: Status: RESOLVED | Noted: 2023-04-05 | Resolved: 2025-08-15

## 2025-08-15 PROBLEM — M93.271 OSTEOCHONDRITIS DISSECANS OF RIGHT TALUS: Status: RESOLVED | Noted: 2022-09-16 | Resolved: 2025-08-15

## 2025-08-15 PROBLEM — M25.512 LEFT SHOULDER PAIN: Status: RESOLVED | Noted: 2023-04-05 | Resolved: 2025-08-15

## 2025-08-15 PROBLEM — M54.9 BACK PAIN: Status: RESOLVED | Noted: 2023-09-20 | Resolved: 2025-08-15

## 2025-08-15 PROBLEM — M79.672 LEFT FOOT PAIN: Status: RESOLVED | Noted: 2023-04-05 | Resolved: 2025-08-15

## 2025-08-15 PROBLEM — S40.022A CONTUSION OF LEFT ARM: Status: RESOLVED | Noted: 2023-04-05 | Resolved: 2025-08-15

## 2025-08-15 PROBLEM — S63.509A WRIST SPRAIN: Status: RESOLVED | Noted: 2023-04-05 | Resolved: 2025-08-15

## 2025-08-15 PROBLEM — M62.89 MUSCLE TIGHTNESS: Status: RESOLVED | Noted: 2023-04-05 | Resolved: 2025-08-15

## 2025-08-15 PROBLEM — R93.7 ABNORMAL X-RAY OF CERVICAL SPINE: Status: RESOLVED | Noted: 2024-11-14 | Resolved: 2025-08-15

## 2025-08-15 PROBLEM — M25.50 MULTIPLE JOINT PAIN: Status: RESOLVED | Noted: 2023-04-05 | Resolved: 2025-08-15

## 2025-08-15 PROBLEM — M25.579 ANKLE PAIN: Status: RESOLVED | Noted: 2023-04-05 | Resolved: 2025-08-15

## 2025-08-15 PROBLEM — M43.10 SPONDYLOLISTHESIS: Status: RESOLVED | Noted: 2023-04-11 | Resolved: 2025-08-15

## 2025-08-15 PROBLEM — M25.522 LEFT ELBOW PAIN: Status: RESOLVED | Noted: 2023-04-05 | Resolved: 2025-08-15

## 2025-08-15 PROBLEM — M54.12 CERVICAL RADICULOPATHY: Status: RESOLVED | Noted: 2023-09-20 | Resolved: 2025-08-15

## 2025-08-15 PROBLEM — M70.60 TROCHANTERIC BURSITIS: Status: RESOLVED | Noted: 2023-04-05 | Resolved: 2025-08-15

## 2025-08-15 PROBLEM — M25.532 ACUTE PAIN OF LEFT WRIST: Status: RESOLVED | Noted: 2025-05-15 | Resolved: 2025-08-15

## 2025-08-15 PROBLEM — M79.642 PAIN IN BOTH HANDS: Status: RESOLVED | Noted: 2023-04-05 | Resolved: 2025-08-15

## 2025-08-15 PROBLEM — M54.2 NECK PAIN: Status: RESOLVED | Noted: 2023-04-05 | Resolved: 2025-08-15

## 2025-08-15 PROBLEM — F41.9 ANXIETY: Status: RESOLVED | Noted: 2023-04-05 | Resolved: 2025-08-15

## 2025-08-15 PROBLEM — M79.606 LOWER EXTREMITY PAIN: Status: RESOLVED | Noted: 2023-04-05 | Resolved: 2025-08-15

## 2025-08-15 PROBLEM — F32.A DEPRESSION: Status: RESOLVED | Noted: 2023-04-11 | Resolved: 2025-08-15

## 2025-08-15 PROBLEM — S00.93XA HEAD CONTUSION: Status: RESOLVED | Noted: 2023-04-05 | Resolved: 2025-08-15

## 2025-08-15 PROBLEM — M79.644 BILATERAL THUMB PAIN: Status: RESOLVED | Noted: 2023-04-05 | Resolved: 2025-08-15

## 2025-08-15 PROBLEM — M79.641 PAIN IN BOTH HANDS: Status: RESOLVED | Noted: 2023-04-05 | Resolved: 2025-08-15

## 2025-08-15 PROBLEM — M77.32 CALCANEAL SPUR, LEFT: Status: RESOLVED | Noted: 2022-09-16 | Resolved: 2025-08-15

## 2025-08-15 PROBLEM — M25.551 RIGHT HIP PAIN: Status: RESOLVED | Noted: 2023-04-05 | Resolved: 2025-08-15

## 2025-08-18 ENCOUNTER — PATIENT MESSAGE (OUTPATIENT)
Dept: ORTHOPEDIC SURGERY | Facility: CLINIC | Age: 60
End: 2025-08-18
Payer: COMMERCIAL

## 2025-08-18 DIAGNOSIS — M48.02 CERVICAL STENOSIS OF SPINE: Primary | ICD-10-CM

## 2025-08-19 ENCOUNTER — APPOINTMENT (OUTPATIENT)
Dept: PHYSICAL THERAPY | Facility: CLINIC | Age: 60
End: 2025-08-19
Payer: COMMERCIAL

## 2025-08-19 DIAGNOSIS — R26.2 DIFFICULTY WALKING: ICD-10-CM

## 2025-08-20 ENCOUNTER — APPOINTMENT (OUTPATIENT)
Dept: ORTHOPEDIC SURGERY | Facility: CLINIC | Age: 60
End: 2025-08-20
Payer: COMMERCIAL

## 2025-08-20 ENCOUNTER — TELEMEDICINE (OUTPATIENT)
Dept: ORTHOPEDIC SURGERY | Facility: CLINIC | Age: 60
End: 2025-08-20
Payer: COMMERCIAL

## 2025-08-20 DIAGNOSIS — M25.531 RIGHT WRIST PAIN: ICD-10-CM

## 2025-08-25 ENCOUNTER — APPOINTMENT (OUTPATIENT)
Dept: PHYSICAL THERAPY | Facility: CLINIC | Age: 60
End: 2025-08-25
Payer: COMMERCIAL

## 2025-08-25 DIAGNOSIS — M54.9 BACK PAIN: Primary | ICD-10-CM

## 2025-08-25 DIAGNOSIS — M62.81 MUSCLE WEAKNESS: ICD-10-CM

## 2025-08-25 DIAGNOSIS — R26.2 DIFFICULTY WALKING: ICD-10-CM

## 2025-08-26 ENCOUNTER — APPOINTMENT (OUTPATIENT)
Dept: ORTHOPEDIC SURGERY | Facility: CLINIC | Age: 60
End: 2025-08-26
Payer: COMMERCIAL

## 2025-08-29 ENCOUNTER — APPOINTMENT (OUTPATIENT)
Dept: ORTHOPEDIC SURGERY | Facility: HOSPITAL | Age: 60
End: 2025-08-29
Payer: COMMERCIAL

## 2025-08-29 ENCOUNTER — HOSPITAL ENCOUNTER (OUTPATIENT)
Dept: RADIOLOGY | Facility: EXTERNAL LOCATION | Age: 60
Discharge: HOME | End: 2025-08-29

## 2025-08-29 RX ORDER — KETOROLAC TROMETHAMINE 10 MG/1
10 TABLET, FILM COATED ORAL EVERY 6 HOURS PRN
Qty: 20 TABLET | Refills: 0 | Status: SHIPPED | OUTPATIENT
Start: 2025-08-29 | End: 2025-09-04

## 2025-09-02 ENCOUNTER — HOSPITAL ENCOUNTER (OUTPATIENT)
Dept: RADIOLOGY | Facility: EXTERNAL LOCATION | Age: 60
Discharge: HOME | End: 2025-09-02

## 2025-09-22 ENCOUNTER — APPOINTMENT (OUTPATIENT)
Dept: PHYSICAL THERAPY | Facility: CLINIC | Age: 60
End: 2025-09-22
Payer: COMMERCIAL

## 2025-09-22 DIAGNOSIS — R26.2 DIFFICULTY WALKING: ICD-10-CM

## 2025-09-29 ENCOUNTER — APPOINTMENT (OUTPATIENT)
Dept: PRIMARY CARE | Facility: CLINIC | Age: 60
End: 2025-09-29
Payer: COMMERCIAL

## 2025-11-06 ENCOUNTER — APPOINTMENT (OUTPATIENT)
Dept: ORTHOPEDIC SURGERY | Facility: CLINIC | Age: 60
End: 2025-11-06
Payer: COMMERCIAL